# Patient Record
Sex: FEMALE
[De-identification: names, ages, dates, MRNs, and addresses within clinical notes are randomized per-mention and may not be internally consistent; named-entity substitution may affect disease eponyms.]

---

## 2018-05-09 ENCOUNTER — HOSPITAL ENCOUNTER (EMERGENCY)
Dept: HOSPITAL 97 - ER | Age: 48
Discharge: HOME | End: 2018-05-09
Payer: SELF-PAY

## 2018-05-09 VITALS — TEMPERATURE: 98.3 F

## 2018-05-09 VITALS — DIASTOLIC BLOOD PRESSURE: 79 MMHG | OXYGEN SATURATION: 99 % | SYSTOLIC BLOOD PRESSURE: 126 MMHG

## 2018-05-09 DIAGNOSIS — J06.9: Primary | ICD-10-CM

## 2018-05-09 PROCEDURE — 87070 CULTURE OTHR SPECIMN AEROBIC: CPT

## 2018-05-09 PROCEDURE — 87081 CULTURE SCREEN ONLY: CPT

## 2018-05-09 PROCEDURE — 87804 INFLUENZA ASSAY W/OPTIC: CPT

## 2018-05-09 PROCEDURE — 99283 EMERGENCY DEPT VISIT LOW MDM: CPT

## 2018-05-09 PROCEDURE — 71046 X-RAY EXAM CHEST 2 VIEWS: CPT

## 2018-05-09 NOTE — ER
Nurse's Notes                                                                                     

 [facility]                                                                

Name: Ebony Haro                                                                            

Age: 48 yrs                                                                                       

Sex: Female                                                                                       

: 1970                                                                                   

MRN: [id number]                                                                                   

Arrival Date: 2018                                                                          

Time: 17:42                                                                                       

Account#: [id number]                                                                            

Bed 30                                                                                            

Private MD:                                                                                       

Diagnosis: Acute upper respiratory infection, unspecified                                         

                                                                                                  

Presentation:                                                                                     

                                                                                             

17:54 Presenting complaint: Patient states: Saturday may 5th i started getting flu like       ch  

      sympotoms, cough, congestion, body aches. Transition of care: patient was not received      

      from another setting of care. Onset of symptoms was May 05, 2018. Initial Sepsis            

      Screen: Does the patient meet any 2 criteria? No. Patient's initial sepsis screen is        

      negative. Does the patient have a suspected source of infection? No. Patient's initial      

      sepsis screen is negative. Care prior to arrival: None. theraflu.                           

17:54 Method Of Arrival: Ambulatory                                                             

17:54 Acuity: OLIVER 3                                                                             

                                                                                                  

Triage Assessment:                                                                                

17:55 General: Appears in no apparent distress. uncomfortable, Behavior is calm, cooperative,   

      appropriate for age. Pain: Denies pain. Respiratory: Airway is patent Respiratory           

      effort is even, unlabored, Breath sounds with wheezes in right posterior lower lobe         

      fine expiratory wheeze. Derm: Skin is pink, warm \T\ dry.                                   

                                                                                                  

OB/GYN:                                                                                           

17:55 LMP 2018                                                                             

                                                                                                  

Historical:                                                                                       

- Allergies:                                                                                      

17:55 No Known Allergies;                                                                       

- Home Meds:                                                                                      

17:55 None [Active];                                                                            

- PMHx:                                                                                           

17:55 None;                                                                                     

- PSHx:                                                                                           

18:00 Tubal ligation;                                                                           

                                                                                                  

- Immunization history:: Adult Immunizations up to date, Last tetanus immunization:               

  unknown, Flu vaccine is not up to date.                                                         

- Social history:: Smoking status: Patient/guardian denies using tobacco.                         

                                                                                                  

                                                                                                  

Screenin:19 Abuse screen: Denies threats or abuse. Nutritional screening: No deficits noted.        mb3 

      Tuberculosis screening: No symptoms or risk factors identified. Fall Risk None              

      identified.                                                                                 

                                                                                                  

Assessment:                                                                                       

19:12 General: Appears in no apparent distress. comfortable, Behavior is calm, cooperative,   mb3 

      appropriate for age. Neuro: No deficits noted. Level of Consciousness is awake, alert,      

      obeys commands, Oriented to person, place, time, situation. Cardiovascular: No deficits     

      noted. Heart tones present Capillary refill < 3 seconds. Respiratory: Reports cough         

      that is productive, persistent Respiratory effort is even, unlabored, Respiratory           

      pattern is regular, symmetrical, Breath sounds are clear bilaterally. GI: No signs          

      and/or symptoms were reported involving the gastrointestinal system. Abdomen is round       

      obese. : No signs and/or symptoms were reported regarding the genitourinary system.       

      EENT: Reports nasal congestion since Saturday morning nasal discharge that is watery.       

      Musculoskeletal: No signs and/or symptoms reported regarding the musculoskeletal system.    

20:10 Reassessment: Patient appears in no apparent distress at this time. Patient is alert,   aa1 

      oriented x 3, equal unlabored respirations, skin warm/dry/pink. Discussed d/c \T\ f/u       

      instructions with pt; denies questions or concerns Patient states feeling better.           

                                                                                                  

Vital Signs:                                                                                      

17:55  / 85; Pulse 115; Resp 22; Temp 98.3(O); Pulse Ox 99% on R/A; Weight 108.86 kg;   ch  

      Height 5 ft. 2 in. (157.48 cm); Pain 1/10;                                                  

19:21  / 96; Pulse 101; Resp 18; Pulse Ox 100% on R/A; Pain 0/10;                       mb3 

20:10  / 79; Pulse 89; Resp 18; Pulse Ox 99% on R/A;                                    aa1 

17:55 Body Mass Index 43.90 (108.86 kg, 157.48 cm)                                            ch  

17:55 when pt coughs, pain is a 6                                                             ch  

                                                                                                  

ED Course:                                                                                        

17:42 Patient arrived in ED.                                                                  sb2 

17:55 Triage completed.                                                                       ch  

17:55 Arm band placed on left wrist. Patient placed in waiting room.                          ch  

18:11 Patient moved to radiology via wheelchair.                                              jb2 

18:12 X-ray completed. Patient tolerated procedure well.                                      jb2 

18:12 Patient moved back from radiology.                                                      jb2 

18:13 XRAY Chest Pa And Lat (2 Views) In Process Unspecified.                                 EDMS

18:52 Bharat Cullen, NANO is Primary Nurse.                                                     mb3 

18:56 Anum Alejandro FNP-C is PHCP.                                                        kb  

18:56 Abdirahman Eden MD is Attending Physician.                                              kb  

19:20 Patient has correct armband on for positive identification. Bed in low position. Call   mb3 

      light in reach. Side rails up X 1.                                                          

19:21 Throat Culture Sent.                                                                    mb3 

20:10 No provider procedures requiring assistance completed. Patient did not have IV access   aa1 

      during this emergency room visit.                                                           

                                                                                                  

Administered Medications:                                                                         

No medications were administered                                                                  

                                                                                                  

                                                                                                  

Outcome:                                                                                          

19:19 Discharge ordered by MD.                                                                caryn  

20:10 Discharged to home ambulatory.                                                          aa1 

20:10 Condition: good                                                                             

20:10 Discharge instructions given to patient, Instructed on discharge instructions, follow       

      up and referral plans. medication usage, Demonstrated understanding of instructions,        

      follow-up care, medications, Prescriptions given X 1.                                       

20:11 Patient left the ED.                                                                    aa1 

                                                                                                  

Signatures:                                                                                       

Dispatcher [employer]                           EDMS                                                 

Anum Alejandro, FNP-C                 FNP-Sabra Stokes, RN                  RN                                                      

Arlyn Escobedo RN                        RN   aa1                                                  

Shimon Blanton2                                                  

Renetta Mnoroy2                                                  

Bharat Cullen, RN                       RN   mb3                                                  

                                                                                                  

Corrections: (The following items were deleted from the chart)                                    

18:00 17:55 PSHx: None; [facility]  

                                                                                                  

**************************************************************************************************

## 2018-05-09 NOTE — EDPHYS
Physician Documentation                                                                           

 [facility]                                                                

Name: Ebony Haro                                                                            

Age: 48 yrs                                                                                       

Sex: Female                                                                                       

: 1970                                                                                   

MRN: [id number]                                                                                   

Arrival Date: 2018                                                                          

Time: 17:42                                                                                       

Account#: [id number]                                                                            

Bed 30                                                                                            

Private MD:                                                                                       

ED Physician Abdirahman Eden                                                                       

HPI:                                                                                              

                                                                                             

19:18 This 48 yrs old  Female presents to ER via Ambulatory with complaints of Flu    kb  

      Symptoms.                                                                                   

19:18 The patient or guardian reports cough, that is intermittent, described as mild, with no kb  

      sputum. Onset: The symptoms/episode began/occurred 5 day(s) ago. Severity of symptoms:      

      At their worst the symptoms were moderate, in the emergency department the symptoms are     

      unchanged. Modifying factors: The symptoms are alleviated by nothing, the symptoms are      

      aggravated by nothing. Associated signs and symptoms: Pertinent positives: rhinorrhea,      

      sore throat, Pertinent negatives: chest pain, diarrhea, ear ache, fever, nausea,            

      vomiting. The patient has not experienced similar symptoms in the past. The patient has     

      not recently seen a physician.                                                              

                                                                                                  

OB/GYN:                                                                                           

17:55 LMP 2018                                                                             

                                                                                                  

Historical:                                                                                       

- Allergies:                                                                                      

17:55 No Known Allergies;                                                                     ch  

- Home Meds:                                                                                      

17:55 None [Active];                                                                          ch  

- PMHx:                                                                                           

17:55 None;                                                                                   ch  

- PSHx:                                                                                           

18:00 Tubal ligation;                                                                         ch  

                                                                                                  

- Immunization history:: Adult Immunizations up to date, Last tetanus immunization:               

  unknown, Flu vaccine is not up to date.                                                         

- Social history:: Smoking status: Patient/guardian denies using tobacco.                         

                                                                                                  

                                                                                                  

ROS:                                                                                              

19:17 Constitutional: Negative for fever, chills, and weight loss, Cardiovascular: Negative   kb  

      for chest pain, palpitations, and edema, Abdomen/GI: Negative for abdominal pain,           

      nausea, vomiting, diarrhea, and constipation, Back: Negative for injury and pain, :       

      Negative for injury, bleeding, discharge, and swelling, MS/Extremity: Negative for          

      injury and deformity, Skin: Negative for injury, rash, and discoloration, Neuro:            

      Negative for headache, weakness, numbness, tingling, and seizure.                           

19:17 ENT: Positive for rhinorrhea, sinus congestion, sore throat.                                

19:17 Respiratory: Positive for cough, Negative for dyspnea on exertion, hemoptysis,              

      orthopnea, pleurisy, shortness of breath, sputum production, wheezing.                      

                                                                                                  

Exam:                                                                                             

19:17 Constitutional:  This is a well developed, well nourished patient who is awake, alert,  kb  

      and in no acute distress. Head/Face:  Normocephalic, atraumatic. Neck:  Trachea             

      midline, no thyromegaly or masses palpated, and no cervical lymphadenopathy.  Supple,       

      full range of motion without nuchal rigidity, or vertebral point tenderness.  No            

      Meningismus. Chest/axilla:  Normal chest wall appearance and motion.  Nontender with no     

      deformity.  No lesions are appreciated. Cardiovascular:  Regular rate and rhythm with a     

      normal S1 and S2.  No gallops, murmurs, or rubs.  Normal PMI, no JVD.  No pulse             

      deficits. Respiratory:  Lungs have equal breath sounds bilaterally, clear to                

      auscultation and percussion.  No rales, rhonchi or wheezes noted.  No increased work of     

      breathing, no retractions or nasal flaring. Abdomen/GI:  Soft, non-tender, with normal      

      bowel sounds.  No distension or tympany.  No guarding or rebound.  No evidence of           

      tenderness throughout. Skin:  Warm, dry with normal turgor.  Normal color with no           

      rashes, no lesions, and no evidence of cellulitis. MS/ Extremity:  Pulses equal, no         

      cyanosis.  Neurovascular intact.  Full, normal range of motion. Neuro:  Awake and           

      alert, GCS 15, oriented to person, place, time, and situation.  Cranial nerves II-XII       

      grossly intact.  Motor strength 5/5 in all extremities.  Sensory grossly intact.            

      Cerebellar exam normal.  Normal gait.                                                       

19:17 ENT: External ear(s): are unremarkable, Ear canal(s): are normal, TM's: are normal,         

      Nose: is normal, Mouth: is normal, Posterior pharynx: Airway: normal, no evidence of        

      obstruction, Tonsils: bilaterally enlarged, Uvula: normal, midline, swelling, that is       

      mild, erythema, is not appreciated, exudate, is not appreciated.                            

                                                                                                  

Vital Signs:                                                                                      

17:55  / 85; Pulse 115; Resp 22; Temp 98.3(O); Pulse Ox 99% on R/A; Weight 108.86 kg;   ch  

      Height 5 ft. 2 in. (157.48 cm); Pain 1/10;                                                  

19:21  / 96; Pulse 101; Resp 18; Pulse Ox 100% on R/A; Pain 0/10;                       mb3 

20:10  / 79; Pulse 89; Resp 18; Pulse Ox 99% on R/A;                                    aa1 

17:55 Body Mass Index 43.90 (108.86 kg, 157.48 cm)                                              

17:55 when pt coughs, pain is a 6                                                             ch  

                                                                                                  

MDM:                                                                                              

18:57 Patient medically screened.                                                             kb  

19:17 Data reviewed: vital signs, nurses notes. Data interpreted: Pulse oximetry: on room air kb  

      is 99 %. Interpretation: normal. Counseling: I had a detailed discussion with the           

      patient and/or guardian regarding: the historical points, exam findings, and any            

      diagnostic results supporting the discharge/admit diagnosis, lab results, radiology         

      results, the need for outpatient follow up, a family practitioner, to return to the         

      emergency department if symptoms worsen or persist or if there are any questions or         

      concerns that arise at home.                                                                

                                                                                                  

                                                                                             

18:00 Order name: Flu; Complete Time: 18:57                                                     

                                                                                             

18:00 Order name: Strep; Complete Time: 18:57                                                   

                                                                                             

18:00 Order name: XRAY Chest Pa And Lat (2 Views); Complete Time: 19:16                         

                                                                                             

18:26 Order name: Throat Culture                                                              [facility]

                                                                                             

18:58 Order name: Vital Signs; Complete Time: 19:21                                           kb  

                                                                                                  

Administered Medications:                                                                         

No medications were administered                                                                  

                                                                                                  

                                                                                                  

Disposition:                                                                                      

22:36 Co-signature as Attending Physician, Abdirahman Eden MD I agree with the assessment and   kdr 

      plan of care.                                                                               

                                                                                                  

Disposition:                                                                                      

18 19:19 Discharged to Home. Impression: Acute upper respiratory infection, unspecified.    

- Condition is Stable.                                                                            

- Discharge Instructions: Upper Respiratory Infection, Adult, Easy-to-Read.                       

- Prescriptions for Tessalon Perles 100 mg Oral Capsule - take 1 capsule by ORAL route            

  every 8 hours As needed; 15 capsule.                                                            

- Medication Reconciliation Form, Thank You Letter, Antibiotic Education, Prescription            

  Opioid Use, Work release form form.                                                             

- Follow up: Emergency Department; When: As needed; Reason: Worsening of condition.               

  Follow up: Private Physician; When: 2 - 3 days; Reason: Recheck today's complaints,             

  Continuance of care, Re-evaluation by your physician.                                           

                                                                                                  

                                                                                                  

                                                                                                  

Signatures:                                                                                       

Dispatcher [facility]                                                 

Anum Alejandro FNP-C FNP-Ckb Hammond, Christina RN                  RN                                                      

Hendry, Arlyn, RN                        RN   aa1                                                  

Abdirahman Eden MD MD   kdr                                                  

                                                                                                  

Corrections: (The following items were deleted from the chart)                                    

18:00 17:55 PSHx: None; [facility]  

20:11 19:19 2018 19:19 Discharged to Home. Impression: Acute upper respiratory          aa1 

      infection, unspecified. Condition is Stable. Forms are Medication Reconciliation Form,      

      Thank You Letter, Antibiotic Education, Prescription Opioid Use. Follow up: Emergency       

      Department; When: As needed; Reason: Worsening of condition. Follow up: Private             

      Physician; When: 2 - 3 days; Reason: Recheck today's complaints, Continuance of care,       

      Re-evaluation by your physician. kb                                                         

                                                                                                  

**************************************************************************************************

## 2018-05-09 NOTE — RAD REPORT
EXAM DESCRIPTION:  RAD - Chest Pa And Lat (2 Views) - 5/9/2018 6:17 pm

 

CLINICAL HISTORY:  Cough and congestion, body aches, possible viral infection

 

COMPARISON:  August 2012

 

TECHNIQUE:  PA and lateral views of the chest were obtained.

 

FINDINGS:  The lungs are clear.   Heart size is normal and central vasculature is within normal limit
s.  No pleural effusion or pneumothorax seen.  No acute bony finding noted.  No aortic abnormality.

 

IMPRESSION:  No acute cardiopulmonary process.  No suspicious change from comparison.

## 2018-08-10 ENCOUNTER — HOSPITAL ENCOUNTER (EMERGENCY)
Dept: HOSPITAL 97 - ER | Age: 48
Discharge: HOME | End: 2018-08-10
Payer: SELF-PAY

## 2018-08-10 DIAGNOSIS — M25.571: Primary | ICD-10-CM

## 2018-08-10 PROCEDURE — 99283 EMERGENCY DEPT VISIT LOW MDM: CPT

## 2018-08-10 NOTE — RAD REPORT
EXAM DESCRIPTION:  RAD - Ankle Right 3 View - 8/10/2018 4:59 pm

 

CLINICAL HISTORY:  PAIN

 

COMPARISON:  No comparisons

 

FINDINGS:  Soft tissue swelling is seen in the region of the lateral malleolus. Minimal bony fragment
ation in the region may be acute or chronic avulsion injury. Prominent calcaneal spur seen.

## 2018-08-10 NOTE — EDPHYS
Physician Documentation                                                                           

 [facility]                                                                

Name: Ebony Haro                                                                            

Age: 48 yrs                                                                                       

Sex: Female                                                                                       

: 1970                                                                                   

MRN: [id number]                                                                                   

Arrival Date: 08/10/2018                                                                          

Time: 16:20                                                                                       

Account#: [id number]                                                                            

Bed 14                                                                                            

Private MD: None, None                                                                            

ED Physician Shoaib Grijalva                                                                      

HPI:                                                                                              

08/10                                                                                             

16:33 This 48 yrs old  Female presents to ER via Ambulatory with complaints of Ankle  cp  

      Injury.                                                                                     

16:33 The patient presents with pain, that is acute. The complaints affect the right medial   cp  

      malleolus. Onset: The symptoms/episode began/occurred today. Context: The problem was       

      sustained at home, resulted from striking area against bed yesterday. Associated signs      

      and symptoms: Pertinent negatives: fever, numbness, tingling. Modifying factors: the        

      symptoms are aggravated by weight bearing.                                                  

                                                                                                  

OB/GYN:                                                                                           

16:23 LMP 7/10/2018                                                                           hj  

                                                                                                  

Historical:                                                                                       

- Allergies:                                                                                      

16:22 No Known Allergies;                                                                     hj  

- Home Meds:                                                                                      

16:22 None [Active];                                                                          hj  

- PMHx:                                                                                           

16:22 None;                                                                                   hj  

- PSHx:                                                                                           

16:22 Tubal ligation;                                                                         hj  

                                                                                                  

- Immunization history:: Adult Immunizations up to date.                                          

- Social history:: Smoking status: Patient/guardian denies using tobacco,                         

  Patient/guardian denies using alcohol.                                                          

- Ebola Screening: : Patient negative for fever greater than or equal to 101.5 degrees            

  Fahrenheit, and additional compatible Ebola Virus Disease symptoms Patient denies               

  exposure to infectious person Patient denies travel to an Ebola-affected area in the            

  21 days before illness onset.                                                                   

                                                                                                  

                                                                                                  

ROS:                                                                                              

16:35 Constitutional: Negative for body aches, chills, fever, poor PO intake.                 cp  

16:35 Eyes: Negative for injury, pain, redness, and discharge.                                cp  

16:35 ENT: Negative for drainage from ear(s), ear pain, sore throat, difficulty swallowing,       

      difficulty handling secretions.                                                             

16:35 Cardiovascular: Negative for chest pain, edema, palpitations.                               

16:35 Respiratory: Negative for cough, shortness of breath, wheezing.                             

16:35 Abdomen/GI: Negative for abdominal pain, nausea, vomiting, and diarrhea.                    

16:35 MS/extremity: Positive for pain, tenderness, of the right medial malleolus, Negative        

      for decreased range of motion, deformity, paresthesias.                                     

16:35 Skin: Negative for cellulitis, rash.                                                        

16:35 Neuro: Negative for numbness, tingling, weakness.                                           

16:35 All other systems are negative.                                                             

                                                                                                  

Exam:                                                                                             

16:48 Constitutional: The patient appears in no acute distress, alert, awake, comfortable,    cp  

      well developed, well nourished.                                                             

16:48 Head/Face:  Normocephalic, atraumatic.                                                  cp  

16:48 Eyes: Periorbital structures: appear normal, Conjunctiva: normal, no exudate, no        cp  

      injection, Lids and lashes: appear normal, bilaterally.                                     

16:48 ENT: External ear(s): are unremarkable, Nose: is normal, Posterior pharynx: is normal,      

      airway is patent.                                                                           

16:48 Chest/axilla: Inspection: normal.                                                       cp  

16:48 Cardiovascular: Rate: normal.                                                               

16:48 Respiratory: the patient does not display signs of respiratory distress,  Respirations:     

      normal, no use of accessory muscles, no retractions, no splinting, no tachypnea.            

16:48 Abdomen/GI: Exam negative for discomfort, distension, guarding, Inspection: abdomen         

      appears normal.                                                                             

16:48 Back: pain, is absent, ROM is normal.                                                       

16:48 Musculoskeletal/extremity: Extremities: grossly normal except: noted in the right           

      medial malleolus: swelling, tenderness, There is no evidence of  decreased ROM,             

      deformity, Perfusion: the extremity is normally perfused throughout, Sensation intact.      

16:48 Skin: cellulitis, is not appreciated, no rash present.                                      

                                                                                                  

Vital Signs:                                                                                      

16:23  / 79; Pulse 90; Resp 18; Temp 97.96(TE); Pulse Ox 97% on R/A; Weight 99.79 kg;   hj  

      Height 5 ft. 2 in. (157.48 cm); Pain 4/10;                                                  

16:23 Body Mass Index 40.24 (99.79 kg, 157.48 cm)                                             hj  

                                                                                                  

MDM:                                                                                              

16:25 Patient medically screened.                                                             cp  

17:00 Differential diagnosis: fracture, sprain, contusion, dislocation.                       cp  

17:25 Data reviewed: vital signs, nurses notes, radiologic studies, plain films.              cp  

17:25 Test interpretation: by ED physician or midlevel provider: plain radiologic studies.    cp  

      Counseling: I had a detailed discussion with the patient and/or guardian regarding: the     

      historical points, exam findings, and any diagnostic results supporting the                 

      discharge/admit diagnosis, radiology results, to return to the emergency department if      

      symptoms worsen or persist or if there are any questions or concerns that arise at home.    

                                                                                                  

08/10                                                                                             

16:31 Order name: XRAY Ankle RIGHT 3 view; Complete Time: 17:21                               cp  

08/10                                                                                             

17:22 Interpretation: Report reviewed.                                                        cp  

                                                                                                  

Administered Medications:                                                                         

16:34 Drug: Ibuprofen 800 mg Route: PO;                                                       la1 

17:14 Follow up: Response: No adverse reaction; Pain is decreased                             em  

                                                                                                  

                                                                                                  

Disposition:                                                                                      

08/10/18 17:27 Discharged to Home. Impression: Pain in right ankle and joints of right foot.      

- Condition is Stable.                                                                            

- Discharge Instructions: Ankle Pain.                                                             

- Prescriptions for Ibuprofen 800 mg Oral Tablet - take 1 tablet by ORAL route every 8            

  hours As needed take with food; 30 tablet.                                                      

- Medication Reconciliation Form, Thank You Letter, Antibiotic Education, Prescription            

  Opioid Use form.                                                                                

- Follow up: Private Physician; When: 5 - 6 days; Reason: if pain continues.                      

- Problem is new.                                                                                 

- Symptoms have improved.                                                                         

                                                                                                  

                                                                                                  

                                                                                                  

Addendum:                                                                                         

2018                                                                                        

     22:13 Co-signature as Attending Physician, Shoaib Grijalva MD Available for consultation at    p
s1

           all times. .                                                                           

                                                                                                  

Signatures:                                                                                       

Dispatcher [employer]                           EDMS                                                 

Von Martin, JUANN                       LVN  em                                                   

Mor Deras RN                         RN   la1                                                  

Roldan Brown RN                      RN   hj                                                   

Luis Alberto Rosen, PA                         PA   Shoaib Marquez MD MD   ps1                                                  

                                                                                                  

Corrections: (The following items were deleted from the chart)                                    

08/10                                                                                             

17:43 17:27 08/10/2018 17:27 Discharged to Home. Impression: Pain in right ankle and joints   em  

      of right foot. Condition is Stable. Forms are Medication Reconciliation Form, Thank You     

      Letter, Antibiotic Education, Prescription Opioid Use. Follow up: Private Physician;        

      When: 5 - 6 days; Reason: if pain continues. Problem is new. Symptoms have improved. cp     

                                                                                                  

**************************************************************************************************

## 2018-08-10 NOTE — ER
Nurse's Notes                                                                                     

 [facility]                                                                

Name: Ebony Haro                                                                            

Age: 48 yrs                                                                                       

Sex: Female                                                                                       

: 1970                                                                                   

MRN: [id number]                                                                                   

Arrival Date: 08/10/2018                                                                          

Time: 16:20                                                                                       

Account#: [id number]                                                                            

Bed 14                                                                                            

Private MD: None, None                                                                            

Diagnosis: Pain in right ankle and joints of right foot                                           

                                                                                                  

Presentation:                                                                                     

08/10                                                                                             

16:21 Presenting complaint: Patient states: i hurt my R ankle yesterday on the bed; denies    hj  

      swelling; pain 4/10;. Transition of care: patient was not received from another setting     

      of care. Onset of symptoms was August 10, 2018. Risk Assessment: Do you want to hurt        

      yourself or someone else? Patient reports no desire to harm self or others. Initial         

      Sepsis Screen: Does the patient meet any 2 criteria? No. Patient's initial sepsis           

      screen is negative. Does the patient have a suspected source of infection? No.              

      Patient's initial sepsis screen is negative. Care prior to arrival: None.                   

16:21 Method Of Arrival: Ambulatory                                                             

16:21 Acuity: OLIVER 4                                                                           hj  

                                                                                                  

Triage Assessment:                                                                                

16:22 General: Appears in no apparent distress. uncomfortable, Behavior is calm, cooperative, hj  

      appropriate for age. Pain: Complains of pain in right medial malleolus.                     

      Musculoskeletal: Reports pain in right medial malleolus.                                    

                                                                                                  

OB/GYN:                                                                                           

16:23 LMP 7/10/2018                                                                             

                                                                                                  

Historical:                                                                                       

- Allergies:                                                                                      

16:22 No Known Allergies;                                                                     hj  

- Home Meds:                                                                                      

16:22 None [Active];                                                                          hj  

- PMHx:                                                                                           

16:22 None;                                                                                   hj  

- PSHx:                                                                                           

16:22 Tubal ligation;                                                                         hj  

                                                                                                  

- Immunization history:: Adult Immunizations up to date.                                          

- Social history:: Smoking status: Patient/guardian denies using tobacco,                         

  Patient/guardian denies using alcohol.                                                          

- Ebola Screening: : Patient negative for fever greater than or equal to 101.5 degrees            

  Fahrenheit, and additional compatible Ebola Virus Disease symptoms Patient denies               

  exposure to infectious person Patient denies travel to an Ebola-affected area in the            

  21 days before illness onset.                                                                   

                                                                                                  

                                                                                                  

Screenin:22 Abuse screen: Denies threats or abuse. Denies injuries from another. Nutritional        hj  

      screening: No deficits noted. Tuberculosis screening: No symptoms or risk factors           

      identified. Fall Risk None identified.                                                      

                                                                                                  

Assessment:                                                                                       

16:35 Reassessment: Patient is alert, oriented x 3, equal unlabored respirations, skin        la1 

      warm/dry/pink. Musculoskeletal: Circulation, motion, and sensation intact. Capillary        

      refill < 3 seconds, is brisk, in bilateral toes. Range of motion: limited in right          

      ankle.                                                                                      

17:15 Reassessment: Patient appears in no apparent distress at this time. Patient and/or      em  

      family updated on plan of care and expected duration. Pain level reassessed. Patient is     

      alert, oriented x 3, equal unlabored respirations, skin warm/dry/pink. Patient states       

      feeling better.                                                                             

                                                                                                  

Vital Signs:                                                                                      

16:23  / 79; Pulse 90; Resp 18; Temp 97.96(TE); Pulse Ox 97% on R/A; Weight 99.79 kg;   hj  

      Height 5 ft. 2 in. (157.48 cm); Pain 4/10;                                                  

16:23 Body Mass Index 40.24 (99.79 kg, 157.48 cm)                                             hj  

                                                                                                  

ED Course:                                                                                        

16:20 Patient arrived in ED.                                                                  sb2 

16:20 None, None is Private Physician.                                                        sb2 

16:22 Triage completed.                                                                       hj  

16:23 Arm band placed on left wrist.                                                          hj  

16:24 Patient has correct armband on for positive identification. Bed in low position. Call   hj  

      light in reach. Side rails up X 1.                                                          

16:25 Luis Alberto Rosen PA is PHCP.                                                                cp  

16:25 Shoiab Grijalva MD is Attending Physician.                                             cp  

16:34 Mor Deras, NANO is Primary Nurse.                                                       la1 

16:35 No provider procedures requiring assistance completed. Patient did not have IV access   la1 

      during this emergency room visit.                                                           

16:59 X-ray completed. Portable x-ray completed in exam room. Patient tolerated procedure     ml  

      well.                                                                                       

17:00 XRAY Ankle RIGHT 3 view In Process Unspecified.                                         EDMS

                                                                                                  

Administered Medications:                                                                         

16:34 Drug: Ibuprofen 800 mg Route: PO;                                                       la1 

17:14 Follow up: Response: No adverse reaction; Pain is decreased                             em  

                                                                                                  

                                                                                                  

Outcome:                                                                                          

17:27 Discharge ordered by MD.                                                                cp  

17:43 Discharged to home ambulatory.                                                          em  

17:43 Condition: good                                                                             

17:43 Discharge instructions given to patient, Instructed on discharge instructions, follow       

      up and referral plans. medication usage, Demonstrated understanding of instructions,        

      follow-up care, medications, Prescriptions given X 1.                                       

17:43 Patient left the ED.                                                                    em  

                                                                                                  

Signatures:                                                                                       

Dispatcher [employer]                           EDMS                                                 

Martin, Von, LVN                       LVN  Princess Delvalle Lee, RN                         RN   la1                                                  

Roldan Brown RN                      RN   hj                                                   

Luis Alberto Rosen PA PA cp Billeau, Sheri                               sb2                                                  

                                                                                                  

Corrections: (The following items were deleted from the chart)                                    

16:25 16:23 Pulse 90bpm; Resp 18bpm; Pulse Ox 97% RA; Temp 97.96F Temporal; 99.79 kg; Height  hj  

      5 ft. 2 in.; BMI: 40.2; Pain 4/10; hj                                                       

                                                                                                  

**************************************************************************************************

## 2018-12-24 ENCOUNTER — HOSPITAL ENCOUNTER (EMERGENCY)
Dept: HOSPITAL 97 - ER | Age: 48
Discharge: HOME | End: 2018-12-24
Payer: SELF-PAY

## 2018-12-24 DIAGNOSIS — J06.9: Primary | ICD-10-CM

## 2018-12-24 PROCEDURE — 99283 EMERGENCY DEPT VISIT LOW MDM: CPT

## 2018-12-24 NOTE — ER
Nurse's Notes                                                                                     

 [facility]                                                                

Name: Ebony Haro                                                                            

Age: 48 yrs                                                                                       

Sex: Female                                                                                       

: 1970                                                                                   

MRN: [id number]                                                                                   

Arrival Date: 2018                                                                          

Time: 09:51                                                                                       

Account#: [id number]                                                                            

Bed 24                                                                                            

Private MD: None, None                                                                            

Diagnosis: Fever, unspecified;Acute upper respiratory infection, unspecified                      

                                                                                                  

Presentation:                                                                                     

                                                                                             

10:10 Presenting complaint: Patient states: cough, sore throat, congestion on and off x 2     aa5 

      weeks ago. Pt reports it got worse 3 days ago. Transition of care: patient was not          

      received from another setting of care. Onset of symptoms was 2018. Risk            

      Assessment: Do you want to hurt yourself or someone else? Patient reports no desire to      

      harm self or others. Initial Sepsis Screen: Does the patient meet any 2 criteria? No.       

      Patient's initial sepsis screen is negative. Does the patient have a suspected source       

      of infection? No. Patient's initial sepsis screen is negative. Care prior to arrival:       

      None.                                                                                       

10:10 Method Of Arrival: Ambulatory                                                           aa5 

10:10 Acuity: OLIVER 4                                                                           aa5 

                                                                                                  

OB/GYN:                                                                                           

10:11 LMP 12/10/2018                                                                          aa5 

                                                                                                  

Historical:                                                                                       

- Allergies:                                                                                      

10:11 No Known Allergies;                                                                     aa5 

- Home Meds:                                                                                      

10:11 None [Active];                                                                          aa5 

- PMHx:                                                                                           

10:11 None;                                                                                   aa5 

- PSHx:                                                                                           

10:11 Tubal ligation;                                                                         aa5 

                                                                                                  

- Immunization history:: Flu vaccine is not up to date.                                           

- Social history:: Smoking status: Patient/guardian denies using tobacco.                         

- Ebola Screening: : No symptoms or risks identified at this time.                                

- Family history:: not pertinent.                                                                 

                                                                                                  

                                                                                                  

Screening:                                                                                        

10:36 Abuse screen: Denies threats or abuse. Denies injuries from another. Nutritional        aj  

      screening: No deficits noted. Tuberculosis screening: No symptoms or risk factors           

      identified. Fall Risk None identified.                                                      

                                                                                                  

Assessment:                                                                                       

10:36 General: Appears in no apparent distress. comfortable, Behavior is calm, cooperative,   aj  

      appropriate for age. Pain: Denies pain. Neuro: Level of Consciousness is awake, alert,      

      obeys commands, Oriented to person, place, time, situation, Appropriate for age.            

      Respiratory: Airway is patent Respiratory effort is even, unlabored, Respiratory            

      pattern is regular, symmetrical, Breath sounds are clear bilaterally. Respiratory:          

      Reports cough that is. EENT: Throat is reddened. Derm: Skin is intact, is healthy with      

      good turgor, Skin is pink, warm \T\ dry. normal.                                            

                                                                                                  

Vital Signs:                                                                                      

10:11  / 94; Pulse 99; Resp 18 S; Temp 99.1(O); Pulse Ox 97% on R/A; Weight 124.74 kg   aa5 

      (R); Height 5 ft. 2 in. (157.48 cm) (R); Pain 6/10;                                         

10:11 Body Mass Index 50.30 (124.74 kg, 157.48 cm)                                            aa5 

                                                                                                  

ED Course:                                                                                        

09:51 Patient arrived in ED.                                                                  sb2 

09:51 None, None is Private Physician.                                                        sb2 

10:10 Triage completed.                                                                       aa5 

10:10 Arm band placed on.                                                                     aa 

10:13 Luis Alberto Leyva MD is Attending Physician.                                             [facility] 

10:15 Debby Funk RN is Primary Nurse.                                                     aj  

10:36 Patient has correct armband on for positive identification.                             aj  

10:36 No provider procedures requiring assistance completed. Patient did not have IV access   aj  

      during this emergency room visit.                                                           

                                                                                                  

Administered Medications:                                                                         

10:21 CANCELLED (Duplicate Order): Zithromax 500 mg PO once                                   lucie 

10:37 Drug: Augmentin 875 mg Route: PO;                                                       aj  

10:57 Follow up: Response: No adverse reaction                                                  

                                                                                                  

                                                                                                  

Outcome:                                                                                          

10:22 Discharge ordered by MD.                                                                lucie 

10:57 Discharged to home ambulatory.                                                          aj  

10:57 Condition: good                                                                             

10:57 Discharge instructions given to patient, Instructed on discharge instructions, follow       

      up and referral plans. medication usage, Demonstrated understanding of instructions,        

      follow-up care, medications, Prescriptions given X 1.                                       

10:57 Patient left the ED.                                                                    aj  

                                                                                                  

Signatures:                                                                                       

Debby Funk, RN                       RN   Luis Alberto Mon MD MD cha Calderon, Audri RN                     RN   aa5                                                  

Renetta Monroy                               sb2                                                  

                                                                                                  

**************************************************************************************************

## 2018-12-24 NOTE — EDPHYS
Physician Documentation                                                                           

 [facility]                                                                

Name: Ebony Haro                                                                            

Age: 48 yrs                                                                                       

Sex: Female                                                                                       

: 1970                                                                                   

MRN: [id number]                                                                                   

Arrival Date: 2018                                                                          

Time: 09:51                                                                                       

Account#: [id number]                                                                            

Bed 24                                                                                            

Private MD: None, None                                                                            

ED Physician Luis Alberto Leyva                                                                      

HPI:                                                                                              

                                                                                             

10:18 This 48 yrs old  Female presents to ER via Ambulatory with complaints of Sore   lucie 

      Throat, STUFFY NOSE.                                                                        

10:18 The patient presents with sore throat. The patient describes throat pain as burning,    lucie 

      constant. Onset: The symptoms/episode began/occurred 2 day(s) ago.                          

10:18 Severity of symptoms: At their worst the symptoms were mild, in the emergency           lucie 

      department the symptoms are unchanged. Modifying factors: The symptoms are alleviated       

      by nothing, the symptoms are aggravated by nothing. Associated signs and symptoms:          

      Pertinent positives: cough, Sore throat.                                                    

                                                                                                  

OB/GYN:                                                                                           

10:11 LMP 12/10/2018                                                                          aa5 

                                                                                                  

Historical:                                                                                       

- Allergies:                                                                                      

10:11 No Known Allergies;                                                                     aa5 

- Home Meds:                                                                                      

10:11 None [Active];                                                                          aa5 

- PMHx:                                                                                           

10:11 None;                                                                                   aa5 

- PSHx:                                                                                           

10:11 Tubal ligation;                                                                         aa5 

                                                                                                  

- Immunization history:: Flu vaccine is not up to date.                                           

- Social history:: Smoking status: Patient/guardian denies using tobacco.                         

- Ebola Screening: : No symptoms or risks identified at this time.                                

- Family history:: not pertinent.                                                                 

                                                                                                  

                                                                                                  

ROS:                                                                                              

10:18 Constitutional: Negative for fever, chills, and weight loss, Eyes: Negative for injury, lucie 

      pain, redness, and discharge, Neck: Negative for injury, pain, and swelling,                

      Cardiovascular: Negative for chest pain, palpitations, and edema, Respiratory: Negative     

      for shortness of breath, cough, wheezing, and pleuritic chest pain, Abdomen/GI:             

      Negative for abdominal pain, nausea, vomiting, diarrhea, and constipation, Back:            

      Negative for injury and pain, : Negative for injury, bleeding, discharge, and             

      swelling, MS/Extremity: Negative for injury and deformity, Skin: Negative for injury,       

      rash, and discoloration, Neuro: Negative for headache, weakness, numbness, tingling,        

      and seizure.                                                                                

10:18 ENT: Positive for rhinorrhea, sore throat.                                                  

                                                                                                  

Exam:                                                                                             

10:18 Constitutional:  This is a well developed, well nourished patient who is awake, alert,  lucie 

      and in no acute distress. Head/Face:  Normocephalic, atraumatic. Eyes:  Pupils equal        

      round and reactive to light, extra-ocular motions intact.  Lids and lashes normal.          

      Conjunctiva and sclera are non-icteric and not injected.  Cornea within normal limits.      

      Periorbital areas with no swelling, redness, or edema. Neck:  Trachea midline, no           

      thyromegaly or masses palpated, and no cervical lymphadenopathy.  Supple, full range of     

      motion without nuchal rigidity, or vertebral point tenderness.  No Meningismus.             

      Chest/axilla:  Normal chest wall appearance and motion.  Nontender with no deformity.       

      No lesions are appreciated. Cardiovascular:  Regular rate and rhythm with a normal S1       

      and S2.  No gallops, murmurs, or rubs.  Normal PMI, no JVD.  No pulse deficits.             

      Respiratory:  Lungs have equal breath sounds bilaterally, clear to auscultation and         

      percussion.  No rales, rhonchi or wheezes noted.  No increased work of breathing, no        

      retractions or nasal flaring. Abdomen/GI:  Soft, non-tender, with normal bowel sounds.      

      No distension or tympany.  No guarding or rebound.  No evidence of tenderness               

      throughout. Back:  No spinal tenderness.  No costovertebral tenderness.  Full range of      

      motion. Skin:  Warm, dry with normal turgor.  Normal color with no rashes, no lesions,      

      and no evidence of cellulitis. MS/ Extremity:  Pulses equal, no cyanosis.                   

      Neurovascular intact.  Full, normal range of motion. Neuro:  Awake and alert, GCS 15,       

      oriented to person, place, time, and situation.  Cranial nerves II-XII grossly intact.      

      Motor strength 5/5 in all extremities.  Sensory grossly intact.  Cerebellar exam            

      normal.  Normal gait. Psych:  Awake, alert, with orientation to person, place and time.     

       Behavior, mood, and affect are within normal limits.                                       

10:18 ENT: Nose: Posterior pharynx: Tonsils: are normal in appearance, Uvula: normal,             

      swelling, is not appreciated, erythema, that is mild, exudate, is not appreciated.          

                                                                                                  

Vital Signs:                                                                                      

10:11  / 94; Pulse 99; Resp 18 S; Temp 99.1(O); Pulse Ox 97% on R/A; Weight 124.74 kg   aa5 

      (R); Height 5 ft. 2 in. (157.48 cm) (R); Pain 6/10;                                         

10:11 Body Mass Index 50.30 (124.74 kg, 157.48 cm)                                            aa5 

                                                                                                  

MDM:                                                                                              

10:14 Patient medically screened.                                                             [facility] 

10:20 Data reviewed: vital signs, nurses notes.                                               [facility] 

                                                                                                  

Administered Medications:                                                                         

10:21 CANCELLED (Duplicate Order): Zithromax 500 mg PO once                                   [facility] 

10:37 Drug: Augmentin 875 mg Route: PO;                                                         

10:57 Follow up: Response: No adverse reaction                                                  

                                                                                                  

                                                                                                  

Disposition:                                                                                      

18 10:22 Discharged to Home. Impression: Fever, unspecified, Acute upper respiratory        

  infection, unspecified.                                                                         

- Condition is Stable.                                                                            

- Discharge Instructions: Upper Respiratory Infection, Adult, Cool Mist Vaporizer,                

  Upper Respiratory Infection, Adult, Easy-to-Read, Cough, Adult.                                 

- Prescriptions for Augmentin 875- 125 mg Oral Tablet - take 1 tablet by ORAL route               

  every 12 hours for 10 days; 20 tablet.                                                          

- Medication Reconciliation Form, Thank You Letter, Antibiotic Education, Prescription            

  Opioid Use form.                                                                                

- Follow up: Private Physician; When: 2 - 3 days; Reason: Recheck today's complaints,             

  Continuance of care, Re-evaluation by your physician.                                           

- Problem is new.                                                                                 

- Symptoms have improved.                                                                         

                                                                                                  

                                                                                                  

                                                                                                  

Signatures:                                                                                       

Dispatcher [employer]                           EDDebby Perkins RN RN aj Anderson, Corey, MD MD cha Calderon, Audri, RN                     RN   aa5                                                  

                                                                                                  

Corrections: (The following items were deleted from the chart)                                    

10:21 10:18 Zithromax 500 mg PO once ordered. [facility] 

10:35 10:16 Influenza Screen (A \T\ B)+BA.LAB.BRZ ordered. [facility]

10:35 10:16 Group A Streptococcus Rapid Sc+BA.LAB.BRZ ordered. [facility]                           EDMS

10:57 10:22 2018 10:22 Discharged to Home. Impression: Fever, unspecified; Acute upper  aj  

      respiratory infection, unspecified. Condition is Stable. Forms are Medication               

      Reconciliation Form, Thank You Letter, Antibiotic Education, Prescription Opioid Use.       

      Follow up: Private Physician; When: 2 - 3 days; Reason: Recheck today's complaints,         

      Continuance of care, Re-evaluation by your physician. Problem is new. Symptoms have         

      improved. [facility]                                                                               

                                                                                                  

**************************************************************************************************

## 2019-02-02 ENCOUNTER — HOSPITAL ENCOUNTER (EMERGENCY)
Dept: HOSPITAL 97 - ER | Age: 49
Discharge: HOME | End: 2019-02-02
Payer: SELF-PAY

## 2019-02-02 VITALS — OXYGEN SATURATION: 98 % | TEMPERATURE: 97.8 F | DIASTOLIC BLOOD PRESSURE: 102 MMHG | SYSTOLIC BLOOD PRESSURE: 151 MMHG

## 2019-02-02 DIAGNOSIS — J06.9: Primary | ICD-10-CM

## 2019-02-02 PROCEDURE — 87070 CULTURE OTHR SPECIMN AEROBIC: CPT

## 2019-02-02 PROCEDURE — 87081 CULTURE SCREEN ONLY: CPT

## 2019-02-02 PROCEDURE — 99284 EMERGENCY DEPT VISIT MOD MDM: CPT

## 2019-02-02 PROCEDURE — 94640 AIRWAY INHALATION TREATMENT: CPT

## 2019-02-02 PROCEDURE — 87804 INFLUENZA ASSAY W/OPTIC: CPT

## 2019-02-02 NOTE — EDPHYS
Physician Documentation                                                                           

 [facility]                                                                

Name: Ebony Haro                                                                            

Age: 49 yrs                                                                                       

Sex: Female                                                                                       

: 1970                                                                                   

MRN: [id number]                                                                                   

Arrival Date: 2019                                                                          

Time: 12:30                                                                                       

Account#: [id number]                                                                            

Bed 12                                                                                            

Private MD:                                                                                       

ED Physician Shoaib Grijalva                                                                      

HPI:                                                                                              

                                                                                             

12:43 This 49 yrs old  Female presents to ER via Ambulatory with complaints of Cough, kb  

      Sore Throat.                                                                                

12:43 The patient or guardian reports cough, that is intermittent, described as moderate,     kb  

      with no sputum, flu symptoms, low-grade fever, myalgias. Onset: The symptoms/episode        

      began/occurred yesterday. Severity of symptoms: At their worst the symptoms were mild,      

      moderate, in the emergency department the symptoms are unchanged. Modifying factors:        

      The symptoms are alleviated by nothing, the symptoms are aggravated by nothing.             

      Associated signs and symptoms: Pertinent positives: diarrhea, fever, rhinorrhea, sore       

      throat, Pertinent negatives: chest pain, ear ache, nausea, vomiting. The patient has        

      not experienced similar symptoms in the past. The patient has not recently seen a           

      physician.                                                                                  

                                                                                                  

OB/GYN:                                                                                           

13:30 LMP N/A -                                                                               iw  

                                                                                                  

Historical:                                                                                       

- Allergies:                                                                                      

12:35 No Known Allergies;                                                                     la1 

- PMHx:                                                                                           

12:35 None;                                                                                   la1 

                                                                                                  

- Immunization history:: Adult Immunizations up to date.                                          

- Social history:: Smoking status: Patient/guardian denies using tobacco.                         

- Ebola Screening: : No symptoms or risks identified at this time.                                

                                                                                                  

                                                                                                  

ROS:                                                                                              

12:40 Neck: Negative for injury, pain, and swelling, Cardiovascular: Negative for chest pain, kb  

      palpitations, and edema, Back: Negative for injury and pain, : Negative for injury,       

      bleeding, discharge, and swelling, MS/Extremity: Negative for injury and deformity,         

      Skin: Negative for injury, rash, and discoloration, Neuro: Negative for headache,           

      weakness, numbness, tingling, and seizure.                                                  

12:40 Constitutional: Positive for body aches, chills, fatigue, malaise, Negative for fever,      

      poor PO intake, weight loss.                                                                

12:40 ENT: Positive for rhinorrhea, sore throat.                                                  

12:40 Respiratory: Positive for cough, Negative for dyspnea on exertion, hemoptysis,              

      orthopnea, pleurisy, shortness of breath, sputum production, wheezing.                      

12:41 Abdomen/GI: Positive for diarrhea, Negative for abdominal pain, nausea and vomiting.    kb  

                                                                                                  

Exam:                                                                                             

12:41 Constitutional:  This is a well developed, well nourished patient who is awake, alert,  kb  

      and in no acute distress. Head/Face:  Normocephalic, atraumatic. ENT:  Nares patent. No     

      nasal discharge, no septal abnormalities noted.  Tympanic membranes are normal and          

      external auditory canals are clear.  Oropharynx with no redness, swelling, or masses,       

      exudates, or evidence of obstruction, uvula midline.  Mucous membranes moist. Neck:         

      Trachea midline, no thyromegaly or masses palpated, and no cervical lymphadenopathy.        

      Supple, full range of motion without nuchal rigidity, or vertebral point tenderness.        

      No Meningismus. Chest/axilla:  Normal chest wall appearance and motion.  Nontender with     

      no deformity.  No lesions are appreciated. Cardiovascular:  Regular rate and rhythm         

      with a normal S1 and S2.  No gallops, murmurs, or rubs.  Normal PMI, no JVD.  No pulse      

      deficits. Abdomen/GI:  Soft, non-tender, with normal bowel sounds.  No distension or        

      tympany.  No guarding or rebound.  No evidence of tenderness throughout. Back:  No          

      spinal tenderness.  No costovertebral tenderness.  Full range of motion. Skin:  Warm,       

      dry with normal turgor.  Normal color with no rashes, no lesions, and no evidence of        

      cellulitis. MS/ Extremity:  Pulses equal, no cyanosis.  Neurovascular intact.  Full,        

      normal range of motion. Neuro:  Awake and alert, GCS 15, oriented to person, place,         

      time, and situation.  Cranial nerves II-XII grossly intact.  Motor strength 5/5 in all      

      extremities.  Sensory grossly intact.  Cerebellar exam normal.  Normal gait.                

12:41 Respiratory: the patient does not display signs of respiratory distress,  Respirations:     

      normal, Breath sounds: wheezing: expiratory that is mild, is scattered.                     

                                                                                                  

Vital Signs:                                                                                      

12:35  / 102; Pulse 113; Resp 18; Temp 97.8; Pulse Ox 98% on R/A; Weight 108.86 kg;     la1 

      Height 5 ft. 2 in. (157.48 cm);                                                             

12:35 Body Mass Index 43.90 (108.86 kg, 157.48 cm)                                            la1 

                                                                                                  

MDM:                                                                                              

12:36 Patient medically screened.                                                             kb  

12:40 Data reviewed: vital signs, nurses notes. Data interpreted: Pulse oximetry: on room air kb  

      is 98 %. Interpretation: normal.                                                            

13:27 Counseling: I had a detailed discussion with the patient and/or guardian regarding: the kb  

      historical points, exam findings, and any diagnostic results supporting the                 

      discharge/admit diagnosis, lab results, the need for outpatient follow up, a family         

      practitioner, to return to the emergency department if symptoms worsen or persist or if     

      there are any questions or concerns that arise at home.                                     

                                                                                                  

                                                                                             

12:37 Order name: Flu; Complete Time: 13:17                                                   iw  

                                                                                             

12:37 Order name: Strep; Complete Time: 13:02                                                 iw  

                                                                                             

13:03 Order name: Throat Culture                                                              EDMS

                                                                                                  

Administered Medications:                                                                         

12:44 Drug: DuoNeb (3:1) (2.5 mg - 0.5 mg) 3 ml Route: Nebulizer;                             iw  

                                                                                                  

                                                                                                  

Disposition:                                                                                      

18:50 Co-signature as Attending Physician, Shoaib Grijalva MD Available for consultation at    ps1 

      all times . Chart complete.                                                                 

                                                                                                  

Disposition:                                                                                      

19 13:27 Discharged to Home. Impression: Acute upper respiratory infection, unspecified.    

- Condition is Stable.                                                                            

- Discharge Instructions: Upper Respiratory Infection, Adult, Easy-to-Read.                       

- Prescriptions for Albuterol Sulfate 90 mcg/actuation - inhale 1-2 puff by INHALATION            

  route every 4-6 hours; 1 Inhaler.                                                               

- Medication Reconciliation Form, Thank You Letter, Antibiotic Education, Prescription            

  Opioid Use form.                                                                                

- Follow up: Emergency Department; When: As needed; Reason: Worsening of condition.               

  Follow up: Private Physician; When: 2 - 3 days; Reason: Recheck today's complaints,             

  Continuance of care, Re-evaluation by your physician.                                           

                                                                                                  

                                                                                                  

                                                                                                  

Signatures:                                                                                       

Dispatcher [employer]                           EDAnum Keane, ROSALINDP-C                 FNP-Ckb                                                   

Fide Hope, RN                     Mor Mares RN                         RN   laShoaib Parada MD MD   ps1                                                  

                                                                                                  

Corrections: (The following items were deleted from the chart)                                    

13:35 13:27 2019 13:27 Discharged to Home. Impression: Acute upper respiratory          iw  

      infection, unspecified. Condition is Stable. Forms are Medication Reconciliation Form,      

      Thank You Letter, Antibiotic Education, Prescription Opioid Use. Follow up: Emergency       

      Department; When: As needed; Reason: Worsening of condition. Follow up: Private             

      Physician; When: 2 - 3 days; Reason: Recheck today's complaints, Continuance of care,       

      Re-evaluation by your physician. kb                                                         

                                                                                                  

**************************************************************************************************

## 2019-02-02 NOTE — ER
Nurse's Notes                                                                                     

 [facility]                                                                

Name: Ebony Haro                                                                            

Age: 49 yrs                                                                                       

Sex: Female                                                                                       

: 1970                                                                                   

MRN: [id number]                                                                                   

Arrival Date: 2019                                                                          

Time: 12:30                                                                                       

Account#: [id number]                                                                            

Bed 12                                                                                            

Private MD:                                                                                       

Diagnosis: Acute upper respiratory infection, unspecified                                         

                                                                                                  

Presentation:                                                                                     

                                                                                             

12:34 Presenting complaint: Patient states: Sore throat and cough for one day, pt reports     la1 

      others at work are sick with similar sx, also reprots diarrhea, denies vomiting.            

      Transition of care: patient was not received from another setting of care. Onset of         

      symptoms was 2019. Risk Assessment: Do you want to hurt yourself or            

      someone else? Patient reports no desire to harm self or others. Initial Sepsis Screen:      

      Does the patient meet any 2 criteria? No. Patient's initial sepsis screen is negative.      

      Does the patient have a suspected source of infection? No. Patient's initial sepsis         

      screen is negative. Care prior to arrival: None.                                            

12:34 Method Of Arrival: Ambulatory                                                           la1 

12:34 Acuity: OLIVER 4                                                                           la1 

                                                                                                  

Triage Assessment:                                                                                

13:30 General: Appears in no apparent distress. Behavior is calm.                             iw  

                                                                                                  

OB/GYN:                                                                                           

13:30 LMP N/A -                                                                               iw  

                                                                                                  

Historical:                                                                                       

- Allergies:                                                                                      

12:35 No Known Allergies;                                                                     la1 

- PMHx:                                                                                           

12:35 None;                                                                                   la1 

                                                                                                  

- Immunization history:: Adult Immunizations up to date.                                          

- Social history:: Smoking status: Patient/guardian denies using tobacco.                         

- Ebola Screening: : No symptoms or risks identified at this time.                                

                                                                                                  

                                                                                                  

Screenin:34 Abuse screen: Denies threats or abuse. Denies injuries from another. Nutritional        iw  

      screening: No deficits noted. Tuberculosis screening: No symptoms or risk factors           

      identified. Fall Risk None identified.                                                      

                                                                                                  

Assessment:                                                                                       

13:00 General: Appears in no apparent distress. Behavior is calm, cooperative. Pain:          iw  

      Complains of pain in throat. Neuro: Level of Consciousness is awake, alert, obeys           

      commands, Oriented to person, place, time, situation, Moves all extremities. Full           

      function. Cardiovascular: Capillary refill < 3 seconds in bilateral fingers.                

      Respiratory: Reports cough that is Airway is patent Respiratory effort is even,             

      unlabored, Breath sounds are clear bilaterally. EENT: Throat is reddened. Derm: Skin is     

      intact, is healthy with good turgor. Musculoskeletal: Range of motion: intact in all        

      extremities.                                                                                

                                                                                                  

Vital Signs:                                                                                      

12:35  / 102; Pulse 113; Resp 18; Temp 97.8; Pulse Ox 98% on R/A; Weight 108.86 kg;     la1 

      Height 5 ft. 2 in. (157.48 cm);                                                             

12:35 Body Mass Index 43.90 (108.86 kg, 157.48 cm)                                            la1 

                                                                                                  

ED Course:                                                                                        

12:30 Patient arrived in ED.                                                                  mr  

12:31 [facility]                                                        kb  

12:31 Shoaib Grijalva MD is Attending Physician.                                             kb  

12:34 Triage completed.                                                                       la1 

12:35 Arm band placed on left wrist.                                                          la1 

12:39 Fide Hope, RN is Primary Nurse.                                                   iw  

13:00 Patient has correct armband on for positive identification.                             iw  

13:34 No provider procedures requiring assistance completed. Patient did not have IV access   iw  

      during this emergency room visit.                                                           

                                                                                                  

Administered Medications:                                                                         

12:44 Drug: DuoNeb (3:1) (2.5 mg - 0.5 mg) 3 ml Route: Nebulizer;                             iw  

                                                                                                  

                                                                                                  

Outcome:                                                                                          

13:27 Discharge ordered by MD.                                                                kb  

13:34 Discharged to home ambulatory.                                                          iw  

13:34 Condition: good                                                                             

13:34 Discharge instructions given to patient, Instructed on discharge instructions, follow       

      up and referral plans. Demonstrated understanding of instructions, follow-up care,          

      medications, Prescriptions given X 1.                                                       

13:35 Patient left the ED.                                                                    iw  

                                                                                                  

Signatures:                                                                                       

Anum Alejandro FNP-C FNP-Ckb Rivera, Mary                                 mr                                                   

Fide Hope, RN                     NANO   iw                                                   

Mor Deras RN                         RN   la                                                  

                                                                                                  

**************************************************************************************************

## 2020-01-22 ENCOUNTER — HOSPITAL ENCOUNTER (EMERGENCY)
Dept: HOSPITAL 97 - ER | Age: 50
Discharge: HOME | End: 2020-01-22
Payer: SELF-PAY

## 2020-01-22 DIAGNOSIS — M54.5: Primary | ICD-10-CM

## 2020-01-22 PROCEDURE — 81003 URINALYSIS AUTO W/O SCOPE: CPT

## 2020-01-22 PROCEDURE — 99282 EMERGENCY DEPT VISIT SF MDM: CPT

## 2020-01-22 PROCEDURE — 81025 URINE PREGNANCY TEST: CPT

## 2020-01-22 NOTE — ER
Nurse's Notes                                                                                     

 [facility]                                                                 

Name: Ebony Haro                                                                            

Age: 50 yrs                                                                                       

Sex: Female                                                                                       

: 1970                                                                                   

MRN: [id number]                                                                                   

Arrival Date: 2020                                                                          

Time: 19:                                                                                       

Account#: [id number]                                                                            

Bed 19                                                                                            

Private MD:                                                                                       

Diagnosis: Low back pain                                                                          

                                                                                                  

Presentation:                                                                                     

                                                                                             

20:01 Presenting complaint: Patient states: Crampy pain that starts in the middle of the low  ca1 

      back that radiates to the R and L lower abdominal area. Intermittent and began a couple     

      a days ago. Denies N/V/Diarrhea. Denies urinary symptom and fever. Transition of care:      

      patient was not received from another setting of care. Onset of symptoms was 2020. Risk Assessment: Do you want to hurt yourself or someone else? Patient            

      reports no desire to harm self or others. Initial Sepsis Screen: Does the patient meet      

      any 2 criteria? No. Patient's initial sepsis screen is negative. Does the patient have      

      a suspected source of infection? No. Patient's initial sepsis screen is negative. Care      

      prior to arrival: None.                                                                     

20:01 Method Of Arrival: Ambulatory                                                           ca1 

20:01 Acuity: OLIVRE 3                                                                           ca1 

                                                                                                  

OB/GYN:                                                                                           

20:05 LMP 2019                                                                          ca1 

                                                                                                  

Historical:                                                                                       

- Allergies:                                                                                      

20:05 No Known Allergies;                                                                     ca1 

- Home Meds:                                                                                      

20:05 None [Active];                                                                          ca1 

- PMHx:                                                                                           

20:05 None;                                                                                   ca1 

- PSHx:                                                                                           

20:05 Tubal ligation;                                                                         ca1 

                                                                                                  

- Immunization history:: Adult Immunizations up to date, Flu vaccine is not up to date.           

- Social history:: Smoking status: Patient denies any tobacco usage or history of.                

- Ebola Screening: : Patient negative for fever greater than or equal to 101.5 degrees            

  Fahrenheit, and additional compatible Ebola Virus Disease symptoms Patient denies               

  exposure to infectious person Patient denies travel to an Ebola-affected area in the            

  21 days before illness onset No symptoms or risks identified at this time.                      

                                                                                                  

                                                                                                  

Screenin:51 Abuse screen: Denies threats or abuse. Nutritional screening: No deficits noted.        ea  

      Tuberculosis screening: No symptoms or risk factors identified. Fall Risk None              

      identified.                                                                                 

                                                                                                  

Assessment:                                                                                       

20:15 General: Appears uncomfortable, Behavior is calm, cooperative, appropriate for age.     ea  

      Pain: Complains of pain in abdomen. Neuro: Level of Consciousness is awake, alert,          

      obeys commands, Oriented to person, place, time, situation. Cardiovascular: Patient's       

      skin is warm and dry. Respiratory: Airway is patent Respiratory effort is even,             

      unlabored, Respiratory pattern is regular, symmetrical. Derm: Skin is pink, warm \T\ dry.   

21:42 Reassessment: Patient and/or family updated on plan of care and expected duration. Pain ea  

      level reassessed. Patient is alert, oriented x 3, equal unlabored respirations, skin        

      warm/dry/pink. Discharge instruction given to patient, verbalized the understanding of      

      instruction. Pt left ED ambulatory accompanied by family.                                   

                                                                                                  

Vital Signs:                                                                                      

20:05  / 82; Pulse 87; Resp 18 S; Temp 98.1(O); Pulse Ox 100% on R/A; Weight 127.01 kg  ca1 

      (R); Height 5 ft. 2 in. (157.48 cm); Pain 2/10;                                             

20:05 Body Mass Index 51.21 (127.01 kg, 157.48 cm)                                            ca1 

                                                                                                  

ED Course:                                                                                        

19:22 Patient arrived in ED.                                                                  mr  

20:01 Nydia Benson, RN is Primary Nurse.                                                      ca1 

20:04 Triage completed.                                                                       ca1 

20:05 Arm band placed on right wrist.                                                         ca1 

20:28 [facility]                                                             la1 

20:28 Cali Gray MD is Attending Physician.                                            la1 

20:51 Patient has correct armband on for positive identification. Placed in gown. Bed in low  ea  

      position. Call light in reach.                                                              

21:42 No provider procedures requiring assistance completed. Patient did not have IV access   ea  

      during this emergency room visit.                                                           

                                                                                                  

Administered Medications:                                                                         

No medications were administered                                                                  

                                                                                                  

                                                                                                  

Outcome:                                                                                          

21:32 Discharge ordered by MD.                                                                la1 

21:43 Discharged to home ambulatory, with family.                                             ea  

21:43 Condition: stable                                                                           

21:43 Discharge instructions given to patient, Instructed on discharge instructions, follow       

      up and referral plans. medication usage, Demonstrated understanding of instructions,        

      follow-up care, medications, Prescriptions given X 1.                                       

21:44 Patient left the ED.                                                                    ea  

                                                                                                  

Signatures:                                                                                       

Rudy Neyda                                 mr                                                   

Mor Deras FNP-C                      FNP-Cla1                                                  

Aliya Rivera RN RN   ea                                                   

Nydia Benson RN RN   ca1                                                  

                                                                                                  

**************************************************************************************************

## 2020-01-22 NOTE — EDPHYS
Physician Documentation                                                                           

 [facility]                                                                 

Name: Ebony Haro                                                                            

Age: 50 yrs                                                                                       

Sex: Female                                                                                       

: 1970                                                                                   

MRN: [id number]                                                                                   

Arrival Date: 2020                                                                          

Time: :                                                                                       

Account#: [id number]                                                                            

Bed 19                                                                                            

Private MD:                                                                                       

ED Physician Cali Gray                                                                     

HPI:                                                                                              

                                                                                             

21:15 This 50 yrs old  Female presents to ER via Ambulatory with complaints of Back   la1 

      Pain, Abdominal Pain.                                                                       

21:15 The patient presents with pain that is acute. The symptoms are located in the low back. la1 

      Onset: The symptoms/episode began/occurred 4 day(s) ago. The pain does not radiate.         

      Associated signs and symptoms: Pertinent negatives: fever, headache, hematuria,             

      incontinence, nausea, numbness, tingling, urinary retention, vomiting, weakness. The        

      problem was sustained from unknown cause. Modifying factors: The patient symptoms are       

      alleviated by nothing. Severity of symptoms: At their worst the symptoms were moderate.     

      The patient has not experienced similar symptoms in the past.                               

                                                                                                  

OB/GYN:                                                                                           

20:05 LMP 2019                                                                          ca1 

                                                                                                  

Historical:                                                                                       

- Allergies:                                                                                      

20:05 No Known Allergies;                                                                     ca1 

- Home Meds:                                                                                      

20:05 None [Active];                                                                          ca1 

- PMHx:                                                                                           

20:05 None;                                                                                   ca1 

- PSHx:                                                                                           

20:05 Tubal ligation;                                                                         ca1 

                                                                                                  

- Immunization history:: Adult Immunizations up to date, Flu vaccine is not up to date.           

- Social history:: Smoking status: Patient denies any tobacco usage or history of.                

- Ebola Screening: : Patient negative for fever greater than or equal to 101.5 degrees            

  Fahrenheit, and additional compatible Ebola Virus Disease symptoms Patient denies               

  exposure to infectious person Patient denies travel to an Ebola-affected area in the            

  21 days before illness onset No symptoms or risks identified at this time.                      

                                                                                                  

                                                                                                  

ROS:                                                                                              

21:20 Constitutional: Negative for fever, chills, and weight loss, Eyes: Negative for injury, la1 

      pain, redness, and discharge, ENT: Negative for injury, pain, and discharge, Neck:          

      Negative for injury, pain, and swelling, Cardiovascular: Negative for chest pain,           

      palpitations, and edema, Respiratory: Negative for shortness of breath, cough,              

      wheezing, and pleuritic chest pain, Abdomen/GI: Negative for abdominal pain, nausea,        

      vomiting, diarrhea, and constipation, : Negative for injury, bleeding, discharge, and     

      swelling, MS/Extremity: Negative for injury and deformity, Skin: Negative for injury,       

      rash, and discoloration, Neuro: Negative for headache, weakness, numbness, tingling,        

      and seizure.                                                                                

21:20 Back: Positive for pain at rest.                                                            

                                                                                                  

Exam:                                                                                             

21:21 Constitutional:  This is a well developed, well nourished patient who is awake, alert,  la1 

      and in no acute distress. Eyes:   Periorbital areas with no swelling, redness, or           

      edema. ENT:   Mucous membranes moist. Neck:  Trachea midline,  Chest/axilla:  Normal        

      chest wall appearance and motion.  Nontender with no deformity.  No lesions are             

      appreciated. Cardiovascular:  Regular rate and rhythm with a normal S1 and S2.  No          

      gallops, murmurs, or rubs.  Normal PMI, no JVD.  No pulse deficits. Respiratory:  Lungs     

      have equal breath sounds bilaterally, clear to auscultation No rales, rhonchi or            

      wheezes noted.  No increased work of breathing, no retractions or nasal flaring.            

      Abdomen/GI:  Soft, non-tender, with normal bowel sounds.  No distension or tympany.  No     

      guarding or rebound.  No evidence of tenderness throughout. Back:  No spinal                

      tenderness.  No costovertebral tenderness.  Full range of motion. MS/ Extremity:            

      Pulses equal, no cyanosis.  Neurovascular intact.  Full, normal range of motion.            

21:32 Neuro: Orientation: is normal, Mentation: is normal, Memory: is normal, Cerebellar      la1 

      function: is grossly normal, Gait: is steady.                                               

                                                                                                  

Vital Signs:                                                                                      

20:05  / 82; Pulse 87; Resp 18 S; Temp 98.1(O); Pulse Ox 100% on R/A; Weight 127.01 kg  ca1 

      (R); Height 5 ft. 2 in. (157.48 cm); Pain 2/10;                                             

20:05 Body Mass Index 51.21 (127.01 kg, 157.48 cm)                                            ca1 

                                                                                                  

MDM:                                                                                              

20:28 Patient medically screened.                                                             la1 

21:30 Data reviewed: vital signs, nurses notes, I have discussed the patient's                la1 

      presentation/case with the attending Emergency Department Physician; and as a result, I     

      will discharge patient. Data interpreted: Pulse oximetry: on room air is 100 %.             

      Interpretation: normal. Test interpretation: by ED physician or midlevel provider:.         

      Counseling: I had a detailed discussion with the patient and/or guardian regarding: the     

      historical points, exam findings, and any diagnostic results supporting the                 

      discharge/admit diagnosis, lab results, the need for outpatient follow up, a family         

      practitioner, to return to the emergency department if symptoms worsen or persist or if     

      there are any questions or concerns that arise at home. ED course: Pt reports               

      intermittent, mild pain in lower back, denies any systemic symptoms, states pain is         

      very mild and her doctor thinks it is menopause but she wanted to get checked out           

      again, no abd tenderness, abd soft, no back tenderness.                                     

21:32 ED course: informed pt of blood in urine and need for FU in that regard.                [facility] 

                                                                                                  

                                                                                             

21:17 Order name: Urine Dipstick--Ancillary (enter results)                                   [facility] 

                                                                                             

21:17 Order name: Urine Pregnancy--Ancillary (enter results)                                  [facility] 

                                                                                             

20:43 Order name: Urine Dipstick-Ancillary (obtain specimen); Complete Time: 21:18            [facility] 

                                                                                             

20:43 Order name: Urine Pregnancy Test (obtain specimen); Complete Time: 21:18                [facility] 

                                                                                             

21:17 Order name: Urine Dipstick-Ancillary                                                    [facility]

                                                                                             

21:17 Order name: Urine Pregnancy--Ancillary                                                  [facility]

                                                                                                  

Administered Medications:                                                                         

No medications were administered                                                                  

                                                                                                  

                                                                                                  

Disposition:                                                                                      

                                                                                             

08:10 Co-signature as Attending Physician, Cali Gray MD I agree with the assessment and tw4 

      plan of care.                                                                               

                                                                                                  

Disposition:                                                                                      

20 21:32 Discharged to Home. Impression: Low back pain.                                     

- Condition is Stable.                                                                            

- Discharge Instructions: Back Pain, Adult, Musculoskeletal Pain, Back Exercises,                 

  Easy-to-Read.                                                                                   

- Prescriptions for Cyclobenzaprine 10 mg Oral Tablet - take 1 tablet by ORAL route               

  every 8 hours As needed; 30 tablet.                                                             

- Medication Reconciliation Form, Thank You Letter form.                                          

- Follow up: Private Physician; When: 2 - 3 days; Reason: Recheck today's complaints,             

  Re-evaluation by your physician.                                                                

- Problem is new.                                                                                 

- Symptoms are unchanged.                                                                         

                                                                                                  

                                                                                                  

                                                                                                  

Signatures:                                                                                       

Dispatcher [employer]                           EDMS                                                 

Mor Deras, [id number]                      FNP-Cla1                                                  

Aliya Rivera RN RN ea Wadley, Terrence, MD MD   4                                                  

Nydia Benson RN                        RN   ca1                                                  

                                                                                                  

Corrections: (The following items were deleted from the chart)                                    

                                                                                             

21:44 21:32 2020 21:32 Discharged to Home. Impression: Low back pain. Condition is      ea  

      Stable. Forms are Medication Reconciliation Form, Thank You Letter, Antibiotic              

      Education, Prescription Opioid Use. Follow up: Private Physician; When: 2 - 3 days;         

      Reason: Recheck today's complaints, Re-evaluation by your physician. Problem is new.        

      Symptoms are unchanged. la1                                                                 

                                                                                                  

**************************************************************************************************

## 2020-01-23 VITALS — SYSTOLIC BLOOD PRESSURE: 148 MMHG | DIASTOLIC BLOOD PRESSURE: 82 MMHG | TEMPERATURE: 98.1 F | OXYGEN SATURATION: 100 %

## 2020-08-15 ENCOUNTER — HOSPITAL ENCOUNTER (EMERGENCY)
Dept: HOSPITAL 97 - ER | Age: 50
Discharge: HOME | End: 2020-08-15
Payer: SELF-PAY

## 2020-08-15 VITALS — DIASTOLIC BLOOD PRESSURE: 86 MMHG | OXYGEN SATURATION: 100 % | SYSTOLIC BLOOD PRESSURE: 129 MMHG

## 2020-08-15 VITALS — TEMPERATURE: 97.1 F

## 2020-08-15 DIAGNOSIS — I10: Primary | ICD-10-CM

## 2020-08-15 PROCEDURE — 99283 EMERGENCY DEPT VISIT LOW MDM: CPT

## 2020-08-15 NOTE — ER
Nurse's Notes                                                                                     

 [facility]                                                                 

Name: Ebony Haro                                                                            

Age: 50 yrs                                                                                       

Sex: Female                                                                                       

: 1970                                                                                   

MRN: [id number]                                                                                   

Arrival Date: 08/15/2020                                                                          

Time: 19:15                                                                                       

Account#: [id number]                                                                            

Bed 8                                                                                             

Private MD:                                                                                       

Diagnosis: Dizziness and giddiness;Hypertension to be determined                                  

                                                                                                  

Presentation:                                                                                     

08/15                                                                                             

19:27 Chief complaint: Patient states: Dizziness that began about 45 mins ago, pt denies      sg  

      headache,denies N/V/D/FEVER/CHILLS, denies any changes with vision, denies any              

      neurological symptoms at this time. Coronavirus screen: Client denies travel out of the     

      [place] in the last 14 days. At this time, the client does not indicate any symptoms           

      associated with coronavirus-19. Ebola Screen: Patient negative for fever greater than       

      or equal to 101.5 degrees Fahrenheit, and additional compatible Ebola Virus Disease         

      symptoms Patient denies exposure to infectious person. Patient denies travel to an          

      Ebola-affected area in the 21 days before illness onset. No symptoms or risks               

      identified at this time. Initial Sepsis Screen: Does the patient meet any 2 criteria?       

      No. Patient's initial sepsis screen is negative. Does the patient have a suspected          

      source of infection? No. Patient's initial sepsis screen is negative. Risk Assessment:      

      Do you want to hurt yourself or someone else? Patient reports no desire to harm self or     

      others. Onset of symptoms was August 15, 2020. Care prior to arrival: None. Transition      

      of care: patient was not received from another setting of care.                             

19:27 Method Of Arrival: Wheelchair                                                           sg  

19:27 Acuity: OLIVER 3                                                                           sg  

                                                                                                  

Historical:                                                                                       

- Allergies:                                                                                      

19:30 No Known Allergies;                                                                     sg  

- Home Meds:                                                                                      

19:30 None [Active];                                                                          sg  

- PMHx:                                                                                           

19:30 None;                                                                                   sg  

- PSHx:                                                                                           

19:30 Tubal ligation;                                                                         sg  

                                                                                                  

- Immunization history:: Adult Immunizations up to date.                                          

- Social history:: Smoking status: Patient denies any tobacco usage or history of.                

                                                                                                  

                                                                                                  

Screenin:50 Abuse screen: Denies threats or abuse. Denies injuries from another. Nutritional        lp1 

      screening: No deficits noted. Tuberculosis screening: No symptoms or risk factors           

      identified. Fall Risk None identified.                                                      

                                                                                                  

Assessment:                                                                                       

19:49 General: Appears in no apparent distress. Behavior is calm, cooperative, appropriate    lp1 

      for age. Pain: Denies pain. Neuro: Level of Consciousness is awake, alert, obeys            

      commands, Oriented to person, place, time, situation, Reports dizziness, since this         

      morning. Cardiovascular: Patient's skin is warm and dry. Respiratory: Respiratory           

      effort is even, unlabored. GI: No signs and/or symptoms were reported involving the         

      gastrointestinal system. : No signs and/or symptoms were reported regarding the           

      genitourinary system. EENT: No signs and/or symptoms were reported regarding the EENT       

      system. Derm: Skin is pink, warm \T\ dry. Musculoskeletal: No deficits noted.               

20:03 Reassessment: Patient and/or family updated on plan of care and expected duration. Pain jb4 

      level reassessed. Patient is alert, oriented x 3, equal unlabored respirations, skin        

      warm/dry/pink. PT reports feeling better after medication administration. Reports           

      dizziness has improved. Is ambulating with steady gait to the restroom.                     

20:30 Reassessment: Patient is alert, oriented x 3, equal unlabored respirations, skin        lp1 

      warm/dry/pink. Patient states feeling better.                                               

                                                                                                  

Vital Signs:                                                                                      

19:27  / 94; Pulse 89; Resp 20; Temp 97.1; Pulse Ox 97% on R/A; Weight 129.73 kg (R);   sg  

      Height 5 ft. 0 in. (152.40 cm) (R); Pain 0/10;                                              

19:49  / 95; Pulse 77; Resp 18; Pulse Ox 100% on R/A;                                   lp1 

21:00  / 83; Pulse 80; Resp 18; Pulse Ox 99% on R/A;                                    lp1 

21:15  / 86; Pulse 72; Resp 18; Pulse Ox 100% on R/A;                                   lp1 

19:27 Body Mass Index 55.85 (129.73 kg, 152.40 cm)                                            sg  

                                                                                                  

ED Course:                                                                                        

19:15 Patient arrived in ED.                                                                  cl3 

19:27 Cali Gray MD is Attending Physician.                                            tw4 

19:29 Triage completed.                                                                       sg  

19:30 Tonny Cox, RN is Primary Nurse.                                                     jb4 

19:30 Arm band placed on.                                                                     sg  

19:50 Chiara Frances, RN is Primary Nurse.                                                       lp1 

19:50 Patient has correct armband on for positive identification.                             lp1 

21:00 No provider procedures requiring assistance completed. Patient did not have IV access   lp1 

      during this emergency room visit.                                                           

21:11 Anastasiya Schulz DO is Referral Physician.                                              tw4 

21:11 Ángel Odom MD is Referral Physician.                                               tw4 

21:12 Jerri Vasquez MD is Referral Physician.                                                     tw4 

                                                                                                  

Administered Medications:                                                                         

19:49 Drug: cloNIDine 0.1 mg Route: PO;                                                       lp1 

                                                                                                  

                                                                                                  

Outcome:                                                                                          

21:12 Discharge ordered by MD.                                                                tw4 

21:25 Discharged to home ambulatory, with family.                                             jb4 

21:25 Condition: stable                                                                           

21:25 Discharge instructions given to patient, Instructed on discharge instructions, follow       

      up and referral plans. medication usage, Demonstrated understanding of instructions,        

      follow-up care, medications, Prescriptions given X 1.                                       

21:36 Patient left the ED.                                                                    jb4 

                                                                                                  

Signatures:                                                                                       

Joshua Quevedo, RN                         RN   sg                                                   

Chiara Frances RN                         RN   lp1                                                  

Tonny Cox RN                       RN   jb4                                                  

Cali Gray MD MD   tw4                                                  

Patrice Haas                                cl3                                                  

                                                                                                  

**************************************************************************************************

## 2020-08-15 NOTE — EDPHYS
Physician Documentation                                                                           

 [facility]                                                                 

Name: Ebony Haro                                                                            

Age: 50 yrs                                                                                       

Sex: Female                                                                                       

: 1970                                                                                   

MRN: [id number]                                                                                   

Arrival Date: 08/15/2020                                                                          

Time: 19:15                                                                                       

Account#: [id number]                                                                            

Bed 8                                                                                             

Private MD:                                                                                       

ED Physician Cali Gray                                                                     

HPI:                                                                                              

                                                                                             

00:25 This 50 yrs old  Female presents to ER via Wheelchair with complaints of        tw4 

      Dizziness.                                                                                  

00:25 The patient presents with dizziness. Onset: The symptoms/episode began/occurred today.  tw4 

      Context: occurred at home, occurred while the patient was standing, just prior to the       

      episode the patient experienced no apparent symptoms. Modifying factors: The symptoms       

      are alleviated by nothing, the symptoms are aggravated by nothing. Associated signs and     

      symptoms: The patient has no apparent associated signs or symptoms. Severity of             

      symptoms: At their worst the symptoms were moderate in the emergency department the         

      symptoms are unchanged. The patient has not experienced similar symptoms in the past.       

                                                                                                  

Historical:                                                                                       

- Allergies:                                                                                      

08/15                                                                                             

19:30 No Known Allergies;                                                                     sg  

- Home Meds:                                                                                      

19:30 None [Active];                                                                          sg  

- PMHx:                                                                                           

19:30 None;                                                                                   sg  

- PSHx:                                                                                           

19:30 Tubal ligation;                                                                         sg  

                                                                                                  

- Immunization history:: Adult Immunizations up to date.                                          

- Social history:: Smoking status: Patient denies any tobacco usage or history of.                

                                                                                                  

                                                                                                  

ROS:                                                                                              

                                                                                             

00:25 Constitutional: Negative for fever, chills, and weight loss, Eyes: Negative for injury, tw4 

      pain, redness, and discharge, Cardiovascular: Negative for chest pain, palpitations,        

      and edema, Respiratory: Negative for shortness of breath, cough, wheezing, and              

      pleuritic chest pain, Abdomen/GI: Negative for abdominal pain, nausea, vomiting,            

      diarrhea, and constipation, Back: Negative for injury and pain, MS/Extremity: Negative      

      for injury and deformity, Skin: Negative for injury, rash, and discoloration.               

      Neuro: Positive for dizziness, Negative for altered mental status, gait disturbance,        

      headache, hearing loss, loss of consciousness, numbness, seizure activity.                  

                                                                                                  

Exam:                                                                                             

00:25 Constitutional:  This is a well developed, well nourished patient who is awake, alert,  tw4 

      and in no acute distress. Head/Face:  Normocephalic, atraumatic. Chest/axilla:  Normal      

      chest wall appearance and motion.  Nontender with no deformity.  No lesions are             

      appreciated. Cardiovascular:  Regular rate and rhythm with a normal S1 and S2.  No          

      gallops, murmurs, or rubs.  Normal PMI, no JVD.  No pulse deficits. Respiratory:  Lungs     

      have equal breath sounds bilaterally, clear to auscultation and percussion.  No rales,      

      rhonchi or wheezes noted.  No increased work of breathing, no retractions or nasal          

      flaring. Abdomen/GI:  Soft, non-tender, with normal bowel sounds.  No distension or         

      tympany.  No guarding or rebound.  No evidence of tenderness throughout. Back:  No          

      spinal tenderness.  No costovertebral tenderness.  Full range of motion. MS/ Extremity:     

       Pulses equal, no cyanosis.  Neurovascular intact.  Full, normal range of motion.           

      Neuro:  Awake and alert, GCS 15, oriented to person, place, time, and situation.            

      Cranial nerves II-XII grossly intact.  Motor strength 5/5 in all extremities.  Sensory      

      grossly intact.  Cerebellar exam normal.  Normal gait.                                      

                                                                                                  

Vital Signs:                                                                                      

08/15                                                                                             

19:27  / 94; Pulse 89; Resp 20; Temp 97.1; Pulse Ox 97% on R/A; Weight 129.73 kg (R);   sg  

      Height 5 ft. 0 in. (152.40 cm) (R); Pain 0/10;                                              

19:49  / 95; Pulse 77; Resp 18; Pulse Ox 100% on R/A;                                   lp1 

21:00  / 83; Pulse 80; Resp 18; Pulse Ox 99% on R/A;                                    lp1 

21:15  / 86; Pulse 72; Resp 18; Pulse Ox 100% on R/A;                                   lp1 

19:27 Body Mass Index 55.85 (129.73 kg, 152.40 cm)                                              

                                                                                                  

MDM:                                                                                              

19:34 Patient medically screened.                                                             tw4 

                                                                                             

00:28 Differential diagnosis: cardiac arrhythmia, CVA, generalized weakness. Data reviewed:   tw4 

      vital signs, nurses notes. Data interpreted: Pulse oximetry: Interpretation: normal.        

      Counseling: I had a detailed discussion with the patient and/or guardian regarding: the     

      historical points, exam findings, and any diagnostic results supporting the                 

      discharge/admit diagnosis. Medication response: clonidine reduced the patient's             

      elevated blood pressure to within acceptable limits. Response to treatment: the             

      patient's symptoms have resolved after treatment, the patient's condition has returned      

      to base line, the patient is now symptom free, and as a result, I will discharge            

      patient. Special discussion: I discussed with the patient/guardian in detail that at        

      this point there is no indication for admission to the hospital. It is understood,          

      however, that if the symptoms persist or worsen the patient needs to return immediately     

      for re-evaluation.                                                                          

                                                                                                  

Administered Medications:                                                                         

08/15                                                                                             

19:49 Drug: cloNIDine 0.1 mg Route: PO;                                                       lp1 

                                                                                                  

                                                                                                  

Disposition:                                                                                      

08/15/20 21:12 Discharged to Home. Impression: Dizziness and giddiness, Hypertension to be        

  determined.                                                                                     

- Condition is Stable.                                                                            

- Discharge Instructions: Dizziness, Hypertension.                                                

- Prescriptions for Norvasc 5 mg Oral Tablet - take 1 tablet by ORAL route once daily;            

  20 tablet.                                                                                      

- Work release form, Medication Reconciliation Form, Thank You Letter, Antibiotic                 

  Education, Prescription Opioid Use form.                                                        

- Follow up: Private Physician; When: Upon discharge from the Emergency Department;               

  Reason: Recheck today's complaints, Continuance of care, Re-evaluation by your                  

  physician. Follow up: Anastasiya Schulz DO; When: 1 - 2 days; Reason: Recheck today's            

  complaints, Continuance of care, Re-evaluation by your physician. Follow up: Ángel Odom MD; When: 1 - 2 days; Reason: Recheck today's complaints, Continuance of               

  care, Re-evaluation by your physician. Follow up: Jerri Vasquez MD; When: 1 - 2 days;               

  Reason: Recheck today's complaints, Continuance of care, Re-evaluation by your                  

  physician.                                                                                      

- Problem is new.                                                                                 

- Symptoms have improved.                                                                         

                                                                                                  

                                                                                                  

                                                                                                  

Signatures:                                                                                       

Joshua Quevedo RN                         RN   sg                                                   

Chiara Frances RN                         RN   lp1                                                  

Tonny Cox RN                       RN   jb4                                                  

Cali Gray MD MD   tw4                                                  

                                                                                                  

Corrections: (The following items were deleted from the chart)                                    

21:36 21:12 08/15/2020 21:12 Discharged to Home. Impression: Dizziness and giddiness;         jb4 

      Hypertension to be determined. Condition is Stable. Forms are Medication Reconciliation     

      Form, Thank You Letter, Antibiotic Education, Prescription Opioid Use. Follow up:           

      Private Physician; When: Upon discharge from the Emergency Department; Reason: Recheck      

      today's complaints, Continuance of care, Re-evaluation by your physician. Follow up:        

      Anastasiya Schulz; When: 1 - 2 days; Reason: Recheck today's complaints, Continuance of        

      care, Re-evaluation by your physician. Follow up: Ángel Odom; When: 1 - 2 days;          

      Reason: Recheck today's complaints, Continuance of care, Re-evaluation by your              

      physician. Follow up: Jerri Vasquez; When: 1 - 2 days; Reason: Recheck today's complaints,        

      Continuance of care, Re-evaluation by your physician. Problem is new. Symptoms have         

      improved. tw4                                                                               

                                                                                                  

**************************************************************************************************

## 2021-04-23 ENCOUNTER — HOSPITAL ENCOUNTER (OUTPATIENT)
Dept: HOSPITAL 97 - ER | Age: 51
Setting detail: OBSERVATION
LOS: 1 days | Discharge: HOME | End: 2021-04-24
Attending: HOSPITALIST | Admitting: FAMILY MEDICINE
Payer: SELF-PAY

## 2021-04-23 VITALS — OXYGEN SATURATION: 97 %

## 2021-04-23 VITALS — BODY MASS INDEX: 51.4 KG/M2

## 2021-04-23 DIAGNOSIS — Z20.822: ICD-10-CM

## 2021-04-23 DIAGNOSIS — E66.9: ICD-10-CM

## 2021-04-23 DIAGNOSIS — Z79.899: ICD-10-CM

## 2021-04-23 DIAGNOSIS — I10: ICD-10-CM

## 2021-04-23 DIAGNOSIS — E78.5: ICD-10-CM

## 2021-04-23 DIAGNOSIS — R07.89: Primary | ICD-10-CM

## 2021-04-23 LAB
ALBUMIN SERPL BCP-MCNC: 4.3 G/DL (ref 3.4–5)
ALP SERPL-CCNC: 74 U/L (ref 45–117)
ALT SERPL W P-5'-P-CCNC: 23 U/L (ref 12–78)
AST SERPL W P-5'-P-CCNC: 15 U/L (ref 15–37)
BUN BLD-MCNC: 13 MG/DL (ref 7–18)
GLUCOSE SERPLBLD-MCNC: 88 MG/DL (ref 74–106)
HCT VFR BLD CALC: 45.1 % (ref 36–45)
INR BLD: 0.96
LYMPHOCYTES # SPEC AUTO: 1.8 K/UL (ref 0.7–4.9)
MAGNESIUM SERPL-MCNC: 2.1 MG/DL (ref 1.8–2.4)
NT-PROBNP SERPL-MCNC: 50 PG/ML (ref ?–125)
PMV BLD: 7.8 FL (ref 7.6–11.3)
POTASSIUM SERPL-SCNC: 3.7 MMOL/L (ref 3.5–5.1)
RBC # BLD: 5.14 M/UL (ref 3.86–4.86)
TROPONIN (EMERG DEPT USE ONLY): < 0.02 NG/ML (ref 0–0.04)

## 2021-04-23 PROCEDURE — 99285 EMERGENCY DEPT VISIT HI MDM: CPT

## 2021-04-23 PROCEDURE — 85379 FIBRIN DEGRADATION QUANT: CPT

## 2021-04-23 PROCEDURE — 94760 N-INVAS EAR/PLS OXIMETRY 1: CPT

## 2021-04-23 PROCEDURE — 36415 COLL VENOUS BLD VENIPUNCTURE: CPT

## 2021-04-23 PROCEDURE — 80053 COMPREHEN METABOLIC PANEL: CPT

## 2021-04-23 PROCEDURE — 83880 ASSAY OF NATRIURETIC PEPTIDE: CPT

## 2021-04-23 PROCEDURE — 93005 ELECTROCARDIOGRAM TRACING: CPT

## 2021-04-23 PROCEDURE — 80048 BASIC METABOLIC PNL TOTAL CA: CPT

## 2021-04-23 PROCEDURE — 85025 COMPLETE CBC W/AUTO DIFF WBC: CPT

## 2021-04-23 PROCEDURE — 82553 CREATINE MB FRACTION: CPT

## 2021-04-23 PROCEDURE — 83735 ASSAY OF MAGNESIUM: CPT

## 2021-04-23 PROCEDURE — 71045 X-RAY EXAM CHEST 1 VIEW: CPT

## 2021-04-23 PROCEDURE — 84484 ASSAY OF TROPONIN QUANT: CPT

## 2021-04-23 PROCEDURE — 80061 LIPID PANEL: CPT

## 2021-04-23 PROCEDURE — 85610 PROTHROMBIN TIME: CPT

## 2021-04-23 PROCEDURE — 71275 CT ANGIOGRAPHY CHEST: CPT

## 2021-04-23 PROCEDURE — 80076 HEPATIC FUNCTION PANEL: CPT

## 2021-04-23 RX ADMIN — METOPROLOL TARTRATE SCH MG: 50 TABLET, FILM COATED ORAL at 23:00

## 2021-04-23 RX ADMIN — ENOXAPARIN SODIUM SCH MG: 40 INJECTION SUBCUTANEOUS at 23:00

## 2021-04-23 RX ADMIN — Medication SCH ML: at 21:00

## 2021-04-23 NOTE — RAD REPORT
EXAM DESCRIPTION:  RAD - Chest Single View - 4/23/2021 3:14 pm

 

CLINICAL HISTORY:  CHEST PAIN

Chest pain.

 

COMPARISON:  Chest Pa And Lat (2 Views) dated 5/9/2018; CHEST SINGLE VIEW dated 8/2/2012

 

FINDINGS:  Portable technique limits examination quality.

 

The lungs are grossly clear. The heart is normal in size. No displaced fractures.

 

IMPRESSION:  No acute intrathoracic process suspected.

## 2021-04-23 NOTE — RAD REPORT
EXAM DESCRIPTION:  CT - Chest For Pe Angio - 4/23/2021 4:29 pm

 

CLINICAL HISTORY:  Chest pain.

CHEST PAIN

 

COMPARISON:  CTANGIO CHEST FOR PE dated 8/2/2012

 

TECHNIQUE:  CT angiogram of the pulmonary arteries was performed with MIP.

 

All CT scans are performed using dose optimization technique as appropriate and may include automated
 exposure control or mA/KV adjustment according to patient size.

 

FINDINGS:  No evidence of pulmonary thromboembolism.

 

No acute aortic finding demonstrated. Aberrant right subclavian artery is noted.

 

The lungs are clear.

 

No significant pericardial or pleural fluid.

 

No concerning bony finding.

 

IMPRESSION:  No evidence of pulmonary thromboembolism.

## 2021-04-23 NOTE — P.HP
Certification for Inpatient


Patient admitted to: Observation


With expected LOS: <2 Midnights


Practitioner: I am a practitioner with admitting privileges, knowledge of 

patient current condition, hospital course, and medical plan of care.


Services: Services provided to patient in accordance with Admission requirements

found in Title 42 Section 412.3 of the Code of Federal Regulations





Patient History


Date of Service: 04/23/21


Reason for admission: chest pain


History of Present Illness: 





51 yrs old  Female with past medical history of hypertension on 

lisinopril came to the ER with left-sided chest pain which has been going on for

1 day  duration.


Left-sided location associated with no radiation intermittent,  pressure like  

not  associated with any diaphoresis or nausea vomiting.


 denies any shortness of breath


 no fever or chills


 no nausea vomiting or diarrhea


 sick contacts





At the time of interview  patient was noted to have accelerated hypertension and

had a initial workup for chest pain was negative so far and is admitted for ACS 

rule out 

















       


Allergies





No Known Allergies Allergy (Unverified 08/02/12 09:42)


   








Review of Systems


10-point ROS is otherwise unremarkable





Physical Examination





- Vital Signs


Temperature: 98.2 F


Blood Pressure: 178/92


Pulse: 76


Respirations: 18





- Physical Exam


General: Alert, In no apparent distress, Oriented x3, Obese


HEENT: Atraumatic, Normocephalic


Neck: Supple, 2+ carotid pulse no bruit


Respiratory: Clear to auscultation bilaterally, Normal air movement


Cardiovascular: Regular rate/rhythm, Normal S1 S2


Capillary refill: <2 Seconds


Gastrointestinal: Soft and benign, Non-distended


Musculoskeletal: No clubbing, No swelling


Integumentary: No rashes, No breakdown


Neurological: Normal speech, Normal strength at 5/5 x4 extr


Lymphatics: No axilla or inguinal lymphadenopathy





- Studies


Laboratory Data (last 24 hrs)





04/23/21 15:20: PT 11.0, INR 0.96


04/23/21 15:20: WBC 13.30 H, Hgb 14.9, Hct 45.1 H, Plt Count 320


04/23/21 15:20: Sodium 137, Potassium 3.7, BUN 13, Creatinine 0.72, Glucose 88, 

Magnesium 2.1, Total Bilirubin 0.6, AST 15, ALT 23, Alkaline Phosphatase 74








Assessment and Plan





- Problems (Diagnosis)


(1) Chest pain


Current Visit: Yes   Status: Acute   


Plan: 





 chest pain to rule out ACS


Trend cardiac enzymes


 start on aspirin statin


 monitor under  telemetry


 cardiology consulted


 CT is negative for PE


Pain control

















(2) Accelerated hypertension


Current Visit: Yes   Status: Acute   


Plan: 





 continue home medications and titrate as needed


 add on metoprolol


Hydralazine p.r.n


 monitor closely under telemetry











(3) Obesity


Current Visit: Yes   Status: Chronic   


Plan: 


Advised lifestyle modification





Discharge Plan: Home


Plan to discharge in: 24 Hours





- Advance Directives


Does patient have a Living Will: No


Does patient have a Durable POA for Healthcare: No


Time Spent Managing Pts Care (In Minutes): 43
27-Jan-2018 17:56

## 2021-04-23 NOTE — ER
Nurse's Notes                                                                                     

 [facility]                                                                 

Name: Ebony Haro                                                                            

Age: 51 yrs                                                                                       

Sex: Female                                                                                       

: 1970                                                                                   

MRN: [id number]                                                                                   

Arrival Date: 2021                                                                          

Time: 12:37                                                                                       

Account#: [id number]                                                                            

Bed 16                                                                                            

Private MD:                                                                                       

Diagnosis: Chest pain, unspecified                                                                

                                                                                                  

Presentation:                                                                                     

                                                                                             

12:52 Chief complaint: Left sided chest pain x 2 hours. Denies SOB/nausea. Coronavirus        hb  

      screen: At this time, the client does not indicate any symptoms associated with             

      coronavirus-19. Ebola Screen: No symptoms or risks identified at this time. Initial         

      Sepsis Screen: Does the patient meet any 2 criteria? No. Patient's initial sepsis           

      screen is negative. Does the patient have a suspected source of infection? No.              

      Patient's initial sepsis screen is negative. Risk Assessment: Do you want to hurt           

      yourself or someone else? Patient reports no desire to harm self or others. Onset of        

      symptoms was 2021.                                                                

12:52 Method Of Arrival: Ambulatory                                                           hb  

12:52 Acuity: OLIVER 3                                                                           hb  

                                                                                                  

Triage Assessment:                                                                                

14:30 General: Appears in no apparent distress. comfortable, obese, Behavior is cooperative,  bp  

      appropriate for age, anxious. Pain: Complains of pain in chest. EENT: No deficits           

      noted. Neuro: No deficits noted. Cardiovascular: Rhythm is sinus rhythm. Respiratory:       

      No deficits noted. GI: No signs and/or symptoms were reported involving the                 

      gastrointestinal system. : No signs and/or symptoms were reported regarding the           

      genitourinary system. Derm: No deficits noted. Musculoskeletal: No deficits noted.          

                                                                                                  

Historical:                                                                                       

- Allergies:                                                                                      

12:54 No Known Allergies;                                                                     hb  

- Home Meds:                                                                                      

12:54 lisinopril 5 mg Oral tab 1 tab once daily [Active];                                     hb  

- PMHx:                                                                                           

12:54 Hypertension;                                                                           hb  

- PSHx:                                                                                           

12:54 Tubal ligation;                                                                         hb  

                                                                                                  

- Immunization history:: Adult Immunizations unknown.                                             

- Social history:: Smoking status: Patient denies any tobacco usage or history of.                

  Patient/guardian denies using alcohol, street drugs, IV drugs.                                  

                                                                                                  

                                                                                                  

Screening:                                                                                        

15:28 Abuse screen: Denies threats or abuse. Denies injuries from another. Nutritional        bp  

      screening: No deficits noted. Tuberculosis screening: No symptoms or risk factors           

      identified. Fall Risk None identified.                                                      

                                                                                                  

Assessment:                                                                                       

14:30 General: SEE TRIAGE NOTE.                                                               bp  

16:30 Reassessment: No changes from previously documented assessment. Patient and/or family   bp  

      updated on plan of care and expected duration. Pain level reassessed. Patient is alert,     

      oriented x 3, equal unlabored respirations, skin warm/dry/pink.                             

17:30 Reassessment: No changes from previously documented assessment. Patient and/or family   bp  

      updated on plan of care and expected duration. Pain level reassessed. Patient is alert,     

      oriented x 3, equal unlabored respirations, skin warm/dry/pink. PT RETURNED FROM CT.        

18:30 Reassessment: Patient appears in no apparent distress at this time. Patient and/or      bp  

      family updated on plan of care and expected duration. Pain level reassessed. Patient is     

      alert, oriented x 3, equal unlabored respirations, skin warm/dry/pink. ADMIT IN PROCESS.    

19:00 General: Appears in no apparent distress. comfortable, Behavior is calm, cooperative.   sf  

      Pain: Complains of pain in chest Pain radiates to left arm. Neuro: No deficits noted.       

      Cardiovascular: Reports chest pain, Denies diaphoresis, lightheadedness, nausea,            

      shortness of breath, Patient's skin is warm and dry. Rhythm is sinus rhythm.                

      Respiratory: No deficits noted. GI: No deficits noted. : No deficits noted. Derm: No      

      deficits noted. Musculoskeletal: No deficits noted.                                         

20:51 Reassessment: Patient appears in no apparent distress at this time. No changes from     sf  

      previously documented assessment. Patient and/or family updated on plan of care and         

      expected duration. Pain level reassessed. Patient is alert, oriented x 3, equal             

      unlabored respirations, skin warm/dry/pink.                                                 

                                                                                                  

Vital Signs:                                                                                      

12:52  / 106; Pulse 84; Resp 16; Temp 97.8; Pulse Ox 100% ; Pain 7/10;                  hb  

14:30 Pulse 90; Resp 19; Pulse Ox 99% ;                                                       bp  

16:30  / 100; Pulse 95; Resp 17; Pulse Ox 98% ;                                         bp  

17:25  / 103; Pulse 97; Resp 17; Pulse Ox 98% ;                                         bp  

18:30  / 107; Pulse 87; Resp 16; Pulse Ox 96% ;                                         bp  

19:00  / 100; Pulse 84; Resp 16; Pulse Ox 96% ;                                         sf  

19:30  / 82; Pulse 83; Resp 16; Pulse Ox 97% ;                                          sf  

20:00  / 85; Pulse 84; Resp 16; Pulse Ox 96% ;                                          sf  

20:30  / 88; Pulse 83; Resp 16; Pulse Ox 98% ;                                          sf  

                                                                                                  

Vitals:                                                                                           

20:51 Cardiac Rhythm Assessment Sinus rhythm.                                                 sf  

                                                                                                  

ED Course:                                                                                        

12:37 Patient arrived in ED.                                                                  ds1 

12:53 Triage completed.                                                                       hb  

12:54 Arm band placed on.                                                                     hb  

14:29 Christopher Cain PA is PHCP.                                                              [facility] 

14:29 Luis Alberto Leyva MD is Attending Physician.                                             [facility] 

14:30 Filiberto Lopez, RN is Primary Nurse.                                                    bp  

15:14 XRAY Chest (1 view) In Process Unspecified.                                             EDMS

15:20 Inserted saline lock: 20 gauge in left forearm, using aseptic technique. Blood          bp  

      collected.                                                                                  

15:28 Patient has correct armband on for positive identification. Bed in low position. Call   bp  

      light in reach. Side rails up X2. Cardiac monitor on. Pulse ox on. NIBP on.                 

16:29 CT Chest For PE Angio In Process Unspecified.                                           EDMS

17:38 Wilbert Stephens MD is Hospitalizing Provider.                                           [facility] 

19:15 COVID swab sent to lab.                                                                 sf  

20:51 Primary Nurse role handed off by Filiberto Lopez, NANO                                     sf  

20:51 Joshua Rodriguez, RN is Primary Nurse.                                               sf  

20:53 COVID-19 : Document "Date of Symptom Onset" if Symptomatic. Sent.                       sf  

20:53 CORONAVIRUS Sent.                                                                       sf  

23:00 No provider procedures requiring assistance completed. Patient admitted, IV remains in  sf  

      place. Patient maintains SpO2 saturation greater than 95% on room air.                      

                                                                                                  

Administered Medications:                                                                         

17:45 Drug: Aspirin Chewable Tablet 324 mg Route: PO;                                         bp  

19:02 Follow up: Response: No adverse reaction                                                bp  

                                                                                                  

                                                                                                  

Outcome:                                                                                          

17:39 Decision to Hospitalize by Provider.                                                    [facility] 

23:00 Admitted to ER Hold.  Please see [facility] for further documentation.                    sf  

23:00 Condition: stable                                                                           

23:00 Instructed on the need for admit.                                                           

                                                                                             

01:25 Patient left the ED.                                                                    sf  

                                                                                                  

Signatures:                                                                                       

Dispatcher [employer]                           EDMS                                                 

Christopher Cain PA PA   Jill Mcdonald                                ds1                                                  

Fara Cheung RN                     RN                                                      

Filiberto Lopez, NANO                      RN                                                      

Joshua Rodriguez, NANO                 RN   sf                                                   

                                                                                                  

Corrections: (The following items were deleted from the chart)                                    

                                                                                             

17:31 17:25 Pulse 97bpm; Resp 17bpm; Pulse Ox 98%; bp                                         bp  

                                                                                                  

**************************************************************************************************

## 2021-04-23 NOTE — EDPHYS
Physician Documentation                                                                           

 [facility]                                                                 

Name: Ebony Haro                                                                            

Age: 51 yrs                                                                                       

Sex: Female                                                                                       

: 1970                                                                                   

MRN: [id number]                                                                                   

Arrival Date: 2021                                                                          

Time: 12:37                                                                                       

Account#: [id number]                                                                            

Bed 16                                                                                            

Private MD:                                                                                       

ED Physician Luis Alberto Leyva                                                                      

HPI:                                                                                              

                                                                                             

14:59 This 51 yrs old  Female presents to ER via Ambulatory with complaints of Chest  jmm 

      Pain.                                                                                       

14:59 The patient or guardian reports chest pain that is located primarily in the substernal  jmm 

      area. Onset: gradually, 3 hour(s) ago. The pain does not radiate. Associated signs and      

      symptoms: Pertinent negatives: abdominal pain, dizziness, headache, lower extremity         

      pain, lower extremity swelling, shortness of breath. The chest pain is described as         

      aching. Duration: The patient or guardian reports a single episode, that is now             

      resolved. Modifying factors: The symptoms are alleviated by nothing. the symptoms are       

      aggravated by nothing. The patient has not experienced similar symptoms in the past.        

                                                                                                  

Historical:                                                                                       

- Allergies:                                                                                      

12:54 No Known Allergies;                                                                     hb  

- Home Meds:                                                                                      

12:54 lisinopril 5 mg Oral tab 1 tab once daily [Active];                                     hb  

- PMHx:                                                                                           

12:54 Hypertension;                                                                           hb  

- PSHx:                                                                                           

12:54 Tubal ligation;                                                                         hb  

                                                                                                  

- Immunization history:: Adult Immunizations unknown.                                             

- Social history:: Smoking status: Patient denies any tobacco usage or history of.                

  Patient/guardian denies using alcohol, street drugs, IV drugs.                                  

                                                                                                  

                                                                                                  

ROS:                                                                                              

14:59 Constitutional: Negative for fever, chills, and weight loss, Respiratory: Negative for  jmm 

      shortness of breath, cough, wheezing, and pleuritic chest pain.                             

14:59 Cardiovascular: Positive for chest pain.                                                    

14:59 All other systems are negative.                                                             

                                                                                                  

Exam:                                                                                             

14:59 Constitutional:  This is a well developed, well nourished patient who is awake, alert,  jmm 

      and in no acute distress. Head/Face:  atraumatic. Eyes:  EOMI, no conjunctival erythema     

      appreciated ENT:  Moist Mucus Membranes Neck:  Trachea midline, Supple Chest/axilla:        

      Normal chest wall appearance and motion.   Cardiovascular:  Regular rate and rhythm.        

      No edema appreciated Respiratory:  Normal respirations, no respiratory distress             

      appreciated Abdomen/GI:  Non distended, soft Back:  Normal ROM Skin:  General               

      appearance color normal MS/ Extremity:  Moves all extremities, no obvious deformities       

      appreciated, no edema noted to the lower extremities  Neuro:  Awake and alert, normal       

      gait Psych:  Behavior is normal, Mood is normal, Patient is cooperative and pleasant        

                                                                                                  

Vital Signs:                                                                                      

12:52  / 106; Pulse 84; Resp 16; Temp 97.8; Pulse Ox 100% ; Pain 7/10;                  hb  

14:30 Pulse 90; Resp 19; Pulse Ox 99% ;                                                       bp  

16:30  / 100; Pulse 95; Resp 17; Pulse Ox 98% ;                                         bp  

17:25  / 103; Pulse 97; Resp 17; Pulse Ox 98% ;                                         bp  

18:30  / 107; Pulse 87; Resp 16; Pulse Ox 96% ;                                         bp  

19:00  / 100; Pulse 84; Resp 16; Pulse Ox 96% ;                                         sf  

19:30  / 82; Pulse 83; Resp 16; Pulse Ox 97% ;                                          sf  

20:00  / 85; Pulse 84; Resp 16; Pulse Ox 96% ;                                          sf  

20:30  / 88; Pulse 83; Resp 16; Pulse Ox 98% ;                                          sf  

                                                                                                  

MDM:                                                                                              

14:35 Patient medically screened.                                                             lucie 

17:37 The patient was given aspirin in the Emergency Department. Data reviewed: vital signs,  [facility] 

      nurses notes, lab test result(s), EKG, radiologic studies, plain films. ED course: Pain     

      in relieved in the ED. Heart Score = 4. I discussed the patient with Dr. Stephens whom         

      accepted the patient for admission. .                                                       

                                                                                                  

                                                                                             

14:51 Order name: Basic Metabolic Panel; Complete Time: 16:00                                 [facility] 

                                                                                             

14:51 Order name: CBC with Diff; Complete Time: 15:41                                         [facility] 

                                                                                             

14:51 Order name: LFT's; Complete Time: 16:00                                                 [facility] 

                                                                                             

14:51 Order name: Magnesium; Complete Time: 16:00                                             [facility] 

                                                                                             

14:51 Order name: NT PRO-BNP; Complete Time: 16:00                                            [facility] 

                                                                                             

14:51 Order name: PT-INR; Complete Time: 15:55                                                [facility] 

                                                                                             

14:51 Order name: Troponin (emerg Dept Use Only); Complete Time: 16:00                        [facility] 

                                                                                             

14:51 Order name: D-Dimer; Complete Time: 15:55                                               [facility] 

                                                                                             

17:55 Order name: CKMB Creatine Kinase MB                                                     [facility]

                                                                                             

17:55 Order name: Lipid Profile                                                               [facility]

                                                                                             

17:55 Order name: Troponin I                                                                  [facility]

                                                                                             

18:45 Order name: COVID-19 : Document "Date of Symptom Onset" if Symptomatic.                 tt3 

                                                                                             

20:06 Order name: CORONAVIRUS                                                                 [facility]

                                                                                             

20:56 Order name: SARS-COV-2 RT PCR; Complete Time: 20:58                                     [facility]

                                                                                             

12:54 Order name: EKG; Complete Time: 12:55                                                   hb  

                                                                                             

12:54 Order name: EKG - Nurse/Tech; Complete Time: 12:54                                        

                                                                                             

14:51 Order name: XRAY Chest (1 view); Complete Time: 15:24                                   [facility] 

                                                                                             

14:51 Order name: Cardiac monitoring; Complete Time: 15:26                                    [facility] 

                                                                                             

14:51 Order name: IV Saline Lock; Complete Time: 15:26                                        [facility] 

                                                                                             

14:51 Order name: Labs collected and sent; Complete Time: 15:27                               [facility] 

                                                                                             

14:51 Order name: O2 Per Protocol; Complete Time: 15:27                                       [facility] 

                                                                                             

14:51 Order name: O2 Sat Monitoring; Complete Time: 15:27                                     [facility] 

                                                                                             

15:51 Order name: CT Chest For PE Angio; Complete Time: 16:38                                 [facility] 

                                                                                             

17:55 Order name: Heart Healthy                                                               [facility]

                                                                                                  

Administered Medications:                                                                         

17:45 Drug: Aspirin Chewable Tablet 324 mg Route: PO;                                         bp  

19:02 Follow up: Response: No adverse reaction                                                bp  

                                                                                                  

                                                                                                  

Disposition:                                                                                      

21 17:39 Hospitalization ordered by Wilbert Stephens for Observation. Preliminary             

  diagnosis is Chest pain, unspecified.                                                           

- Bed requested for Telemetry/MedSurg (observation).                                              

- Status is Observation.                                                                      sf  

- Condition is Stable.                                                                            

- Problem is new.                                                                                 

- Symptoms have improved.                                                                         

                                                                                                  

                                                                                                  

                                                                                                  

Addendum:                                                                                         

2021                                                                                        

     08:12 Co-signature as Attending Physician, Luis Alberto Leyva MD I agree with the assessment and  c
ha

           plan of care.                                                                          

                                                                                                  

Signatures:                                                                                       

Dispatcher [employer]                           [facility]                                                 

Aline Escobedo RN RN mw Anderson, Corey, MD MD cha Mickail, Joel, PA PA jmm Baxter, Heather, RN RN                                                      

Filiberto Lopez RN RN   bp                                                   

Rodriguez, Joshua, RN                 RN   sf                                                   

                                                                                                  

Corrections: (The following items were deleted from the chart)                                    

                                                                                             

00:33  17:39 Hospitalization Ordered by Wilbert Stephens MD for Observation. Preliminary    mw  

      diagnosis is Chest pain, unspecified. Bed requested for Telemetry/MedSurg                   

      (observation). Status is Observation. Condition is Stable. Problem is new. Symptoms         

      have improved. roc                                                                          

                                                                                             

01:25 00:33 2021 17:39 Hospitalization Ordered by Wilbert Stephens MD for Observation.     sf  

      Preliminary diagnosis is Chest pain, unspecified. Bed requested for Telemetry/MedSurg       

      (observation). Status is Observation. Condition is Stable. Problem is new. Symptoms         

      have improved. mw                                                                           

                                                                                                  

**************************************************************************************************

## 2021-04-24 VITALS — TEMPERATURE: 97.7 F

## 2021-04-24 VITALS — DIASTOLIC BLOOD PRESSURE: 87 MMHG | SYSTOLIC BLOOD PRESSURE: 134 MMHG

## 2021-04-24 LAB
ALBUMIN SERPL BCP-MCNC: 3.6 G/DL (ref 3.4–5)
ALP SERPL-CCNC: 60 U/L (ref 45–117)
ALT SERPL W P-5'-P-CCNC: 22 U/L (ref 12–78)
AST SERPL W P-5'-P-CCNC: 14 U/L (ref 15–37)
BUN BLD-MCNC: 11 MG/DL (ref 7–18)
CKMB CREATINE KINASE MB: 1.4 NG/ML (ref 0.3–3.6)
GLUCOSE SERPLBLD-MCNC: 95 MG/DL (ref 74–106)
HCT VFR BLD CALC: 41 % (ref 36–45)
HDLC SERPL-MCNC: 35 MG/DL (ref 40–60)
LDLC SERPL CALC-MCNC: 121 MG/DL (ref ?–130)
LYMPHOCYTES # SPEC AUTO: 2.6 K/UL (ref 0.7–4.9)
PMV BLD: 7.5 FL (ref 7.6–11.3)
POTASSIUM SERPL-SCNC: 4 MMOL/L (ref 3.5–5.1)
RBC # BLD: 4.71 M/UL (ref 3.86–4.86)
TROPONIN I: < 0.02 NG/ML (ref 0–0.04)

## 2021-04-24 RX ADMIN — Medication SCH: at 09:00

## 2021-04-24 RX ADMIN — METOPROLOL TARTRATE SCH MG: 50 TABLET, FILM COATED ORAL at 09:12

## 2021-04-24 RX ADMIN — ENOXAPARIN SODIUM SCH: 40 INJECTION SUBCUTANEOUS at 09:00

## 2021-04-24 NOTE — EKG
Test Date:    2021-04-23               Test Time:    12:50:01

Technician:   HB                                     

                                                     

MEASUREMENT RESULTS:                                       

Intervals:                                           

Rate:         83                                     

SC:           114                                    

QRSD:         86                                     

QT:           392                                    

QTc:          460                                    

Axis:                                                

P:            13                                     

SC:           114                                    

QRS:          -16                                    

T:            20                                     

                                                     

INTERPRETIVE STATEMENTS:                                       

                                                     

Normal sinus rhythm

Minimal voltage criteria for LVH, may be normal variant

Borderline ECG

Compared to ECG 08/02/2012 10:54:51

No significant changes



Electronically Signed On 04-24-21 07:56:37 CDT by Artemio Connelly

## 2021-04-24 NOTE — CON
Date of Consultation:  04/24/2021



Reason For Consultation:  Chest pain.



History Of Present Illness:  Ms. Haro is a 51-year-old woman with history of hypertension, border
line hypertriglyceridemia.  She came in with a soreness type of chest pain over the left anterior patrice
st and left shoulder that felt like an ache or soreness.  Had some nausea but no vomiting.  No diapho
resis.  No shortness of breath.  Denied any PND, orthopnea, pedal edema, palpitation, or syncope.  De
nied any fever or chills.  She was hypertensive when she came to the emergency room at 178/92.



Past Medical History:  As stated above.



Allergies:  NONE.



Review of Systems:

Negative.



Social History:  Negative for tobacco.



Family History:  Negative for heart disease.



Medications:  At home include lisinopril.



Physical Examination:

Vital Signs:  Blood pressure was 178/92 initially, after metoprolol and went down to 125/61. 

HEENT:  Negative. 

Neck:  Supple with no bruit. 

Chest:  Clear to auscultation and percussion.  

Cardiac:  Revealed a regular rhythm and rate.  No murmurs, gallops, or rubs. 

Abdomen:  Benign. 

Extremities:  Revealed no clubbing, cyanosis, or edema.



Diagnostic Data:  Included a normal EKG, normal chest x-ray, normal CT angiogram.  Her D-dimer was 90
8.  Her white count was 26728.  Troponins were negative.



Impression And Plan:  

1.Atypical chest pain.

2.Hypertension.

3.Obesity.

4.Dyslipidemia. 

I think the patient is to continue on lisinopril.  We need to add beta-blocker to her regimen.  I thi
nk she needs to have an outpatient echocardiogram and a stress test, but I am comfortable with her go
ing home today and I will make arrangements for her outpatient testing in the office as an outpatient
.





NB/ASHLYN

DD:  04/24/2021 08:34:33Voice ID:  [id number]

DT:  04/24/2021 12:12:41Report ID:  [id number]

## 2021-05-10 NOTE — P.DS
Discharge Date: 04/24/21


Disposition: ROUTINE DISCHARGE


Discharge Condition: GOOD


Reason for Admission: chest pain


Brief History of Present Illness: 








Patient is a 51 yrs old  Female with past medical history of 

hypertension on lisinopril came to the ER with left-sided chest pain which has 

been going on for 1 day  duration.


Left-sided location associated with no radiation intermittent,  pressure like  

not  associated with any diaphoresis or nausea vomiting.


Patient denies any shortness of breath, no fever, or chills, no nausea vomiting 

or diarrhea.  Patient denies any sick contact. 





At the time of interview  patient was noted to have accelerated hypertension and

had a initial workup for chest pain was negative so far and is admitted for ACS 

rule out.  Cardiology will be consulted and patient be admitted to be ruled out 

for acute coronary syndrome.





Hospital Course: 





Patient workup was unremarkable.  Troponins were negative.  Blood pressure was 

stable.  At this time, patient is stable for discharge home with outpatient 

follow with Cardiology in 1-2 weeks.


Vital Signs/Physical Exam: 














Temp Pulse Resp BP Pulse Ox


 


 97.7 F   77   16   134/87   97 


 


 04/24/21 08:00  04/24/21 09:12  04/24/21 08:00  04/24/21 09:12  04/24/21 08:00








General: Alert, In no apparent distress, Oriented x3


Laboratory Data at Discharge: 














WBC  9.40 K/uL (4.3-10.9)  D 04/24/21  06:06    


 


Hgb  13.9 g/dL (12.0-15.0)   04/24/21  06:06    


 


Hct  41.0 % (36.0-45.0)   04/24/21  06:06    


 


Plt Count  294 K/uL (152-406)   04/24/21  06:06    


 


PT  11.0 SECONDS (9.5-12.5)   04/23/21  15:20    


 


INR  0.96   04/23/21  15:20    


 


Sodium  138 mmol/L (136-145)   04/24/21  06:06    


 


Potassium  4.0 mmol/L (3.5-5.1)   04/24/21  06:06    


 


BUN  11 mg/dL (7-18)   04/24/21  06:06    


 


Creatinine  0.66 mg/dL (0.55-1.3)   04/24/21  06:06    


 


Glucose  95 mg/dL ()   04/24/21  06:06    


 


Magnesium  2.1 mg/dL (1.8-2.4)   04/23/21  15:20    


 


Total Bilirubin  0.8 mg/dL (0.2-1.0)   04/24/21  06:06    


 


AST  14 U/L (15-37)  L  04/24/21  06:06    


 


ALT  22 U/L (12-78)   04/24/21  06:06    


 


Alkaline Phosphatase  60 U/L ()   04/24/21  06:06    


 


Troponin I  Cancelled   04/24/21  21:00    


 


Triglycerides  150 mg/dL (<150)   04/24/21  06:06    


 


Cholesterol  186 mg/dL (<200)   04/24/21  06:06    


 


HDL Cholesterol  35 mg/dL (40-60)  L  04/24/21  06:06    


 


Cholesterol/HDL Ratio  5.31   04/24/21  06:06    








Home Medications: 








Amlodipine [Norvasc*] 2.5 mg PO DAILY 04/24/21 


Aspirin [Aspirin EC 81 MG] 81 mg PO DAILY 30 Days #30 tablet. 04/24/21 


Atorvastatin Calcium [Lipitor] 40 mg PO BEDTIME 30 Days #30 tab 04/24/21 


Metoprolol Tartrate [Lopressor] 50 mg PO BID 30 Days #60 tablet 04/24/21 





New Medications: 


Aspirin [Aspirin EC 81 MG] 81 mg PO DAILY 30 Days #30 tablet.


Atorvastatin Calcium [Lipitor] 40 mg PO BEDTIME 30 Days #30 tab


Metoprolol Tartrate [Lopressor] 50 mg PO BID 30 Days #60 tablet


Physician Discharge Instructions: 


PROBLEM: Chest Pain





GOAL: Clear understanding of disease process





INSTRUCTIONS:








Diet: heart healthy





Activity: Fall precautions





OK TO DC IV AND DC HOME


FOLLOW-UP WITH PRIMARY CARE PROVIDER IN 1-2 WEEKS


FOLLOW-UP WITH CARDIOLOGY IN 1-2 WEEKS


RETURN TO THE ER IF symptoms worsen


CALL or TEXT DR. ADAMS -[phone] IF ANY QUESTIONS REGARDING HOSPITAL STAY.


PLEASE CALL THE FLOOR -[phone] IF ANY MEDICATION OR NURSING QUESTIONS.





per Dr. Connelly-start metoprolol tartrate 50mg BID. 


Diet: AHA


Activity: Fall precautions


Followup: 


Artemio Connelly MD [ACTIVE - CAN ADMIT] - 1-2 Weeks (Call to make an 

appointment. )


OOT,OOT [Primary Care Provider] - 


Time spent managing pt's care (in minutes): 35

## 2021-09-01 ENCOUNTER — HOSPITAL ENCOUNTER (EMERGENCY)
Dept: HOSPITAL 97 - ER | Age: 51
Discharge: HOME | End: 2021-09-01
Payer: SELF-PAY

## 2021-09-01 VITALS — TEMPERATURE: 98 F | DIASTOLIC BLOOD PRESSURE: 80 MMHG | SYSTOLIC BLOOD PRESSURE: 136 MMHG | OXYGEN SATURATION: 100 %

## 2021-09-01 DIAGNOSIS — R19.7: Primary | ICD-10-CM

## 2021-09-01 DIAGNOSIS — I10: ICD-10-CM

## 2021-09-01 LAB
ALBUMIN SERPL BCP-MCNC: 3.6 G/DL (ref 3.4–5)
ALP SERPL-CCNC: 59 U/L (ref 45–117)
ALT SERPL W P-5'-P-CCNC: 58 U/L (ref 12–78)
AST SERPL W P-5'-P-CCNC: 35 U/L (ref 15–37)
BUN BLD-MCNC: 6 MG/DL (ref 7–18)
GLUCOSE SERPLBLD-MCNC: 126 MG/DL (ref 74–106)
HCT VFR BLD CALC: 41 % (ref 36–45)
INR BLD: 1.04
LYMPHOCYTES # SPEC AUTO: 2 K/UL (ref 0.7–4.9)
MAGNESIUM SERPL-MCNC: 1.9 MG/DL (ref 1.8–2.4)
NT-PROBNP SERPL-MCNC: 113 PG/ML (ref ?–125)
PMV BLD: 7.2 FL (ref 7.6–11.3)
POTASSIUM SERPL-SCNC: 3.3 MMOL/L (ref 3.5–5.1)
RBC # BLD: 4.77 M/UL (ref 3.86–4.86)
TROPONIN (EMERG DEPT USE ONLY): < 0.02 NG/ML (ref 0–0.04)

## 2021-09-01 PROCEDURE — 70450 CT HEAD/BRAIN W/O DYE: CPT

## 2021-09-01 PROCEDURE — 99283 EMERGENCY DEPT VISIT LOW MDM: CPT

## 2021-09-01 PROCEDURE — 80048 BASIC METABOLIC PNL TOTAL CA: CPT

## 2021-09-01 PROCEDURE — 36415 COLL VENOUS BLD VENIPUNCTURE: CPT

## 2021-09-01 PROCEDURE — 83880 ASSAY OF NATRIURETIC PEPTIDE: CPT

## 2021-09-01 PROCEDURE — 96374 THER/PROPH/DIAG INJ IV PUSH: CPT

## 2021-09-01 PROCEDURE — 71045 X-RAY EXAM CHEST 1 VIEW: CPT

## 2021-09-01 PROCEDURE — 84484 ASSAY OF TROPONIN QUANT: CPT

## 2021-09-01 PROCEDURE — 83735 ASSAY OF MAGNESIUM: CPT

## 2021-09-01 PROCEDURE — 85610 PROTHROMBIN TIME: CPT

## 2021-09-01 PROCEDURE — 93005 ELECTROCARDIOGRAM TRACING: CPT

## 2021-09-01 PROCEDURE — 85025 COMPLETE CBC W/AUTO DIFF WBC: CPT

## 2021-09-01 PROCEDURE — 80076 HEPATIC FUNCTION PANEL: CPT

## 2021-09-01 NOTE — RAD REPORT
EXAM DESCRIPTION:  RAD - Chest Single View - 9/1/2021 6:37 pm

 

CLINICAL HISTORY:  weakness, fatigue

 

COMPARISON:  Portable April 23

 

TECHNIQUE:  AP portable chest image was obtained 9/1/2021 6:37 pm .

 

FINDINGS:  Exam is limited by shallow inspiration, under penetrated portable technique and large body
 habitus. The overlying breast soft tissues limit lower lung field evaluation.

 

No large consolidation or mass identified. Small patchy infiltrates are certainly possible. Failure a
nd volume overload are not suspected.

 

 Heart and vasculature are normal. No measurable pleural effusion and no pneumothorax. No acute bony 
abnormality seen. No acute aortic findings suspected.

 

IMPRESSION:  Limited portable study shows no large mass or consolidation.

 

Small patchy infiltrates are certainly possible.

## 2021-09-01 NOTE — RAD REPORT
EXAM DESCRIPTION:  CT - Head Brain Wo Cont - 9/1/2021 6:20 pm

 

CLINICAL HISTORY:  DIZZINESS

 

COMPARISON:  HEAD BRAIN W O CONTRAST dated 11/15/2010

 

TECHNIQUE:  Axial 5 mm thick images of the head were obtained without IV contrast.

 

All CT scans are performed using dose optimization technique as appropriate and may include automated
 exposure control or mA/KV adjustment according to patient size.

 

FINDINGS:  No intracranial hemorrhage, mass, edema or shift of mid-line structures. No acute infarcti
on changes seen. No abnormal extra-axial fluid collections. Ventricles are normal.

 

Mastoid air cells are clear. Mucosal thickening and small air-fluid level seen right maxillary sinus.


 

No acute bony findings.

 

 

IMPRESSION:  No intracranial abnormality identified. No intracranial changes from remote 2010 study.

 

Mucosal thickening and air-fluid level in the right maxillary sinus.

## 2021-09-01 NOTE — EDPHYS
Physician Documentation                                                                           

 [facility]                                                                 

Name: Ebony Haro                                                                            

Age: 51 yrs                                                                                       

Sex: Female                                                                                       

: 1970                                                                                   

MRN: [id number]                                                                                   

Arrival Date: 2021                                                                          

Time: 17:27                                                                                       

Account#: [id number]                                                                            

Bed 4                                                                                             

Private MD:                                                                                       

ED Physician Jacob Gar                                                                         

HPI:                                                                                              

                                                                                             

17:45 This 51 yrs old  Female presents to ER via Ambulatory with complaints of        jmm 

      Dizziness, High Blood Pressure.                                                             

17:45 Onset: The symptoms/episode began/occurred today. Modifying factors: The symptoms are   jmm 

      alleviated by nothing, the symptoms are aggravated by nothing. Associated signs and         

      symptoms: Pertinent negatives: shortness of breath. Is a 51-year-old female with a          

      history of hypertension that presents to the emergency department with complaints of        

      multiple episodes of diarrhea. Patient states she developed dizziness today, denies         

      sensation of the room spinning but had one episode of nausea with it. Denies chest          

      pain, shortness of breath. Patient recently states she recovered from coronavirus.          

                                                                                                  

Historical:                                                                                       

- Allergies:                                                                                      

17:29 No Known Allergies;                                                                     sv  

- PMHx:                                                                                           

17:29 Hypertension;                                                                           sv  

                                                                                                  

- Immunization history:: Adult Immunizations up to date.                                          

- Social history:: Smoking status: Patient denies any tobacco usage or history of.                

                                                                                                  

                                                                                                  

ROS:                                                                                              

17:45 Constitutional: Negative for fever, chills, and weight loss, Cardiovascular: Negative   jmm 

      for chest pain, palpitations, and edema, Respiratory: Negative for shortness of breath,     

      cough, wheezing, and pleuritic chest pain.                                                  

17:45 Abdomen/GI: Positive for vomiting, diarrhea.                                                

17:45 Neuro: Positive for dizziness.                                                              

17:45 All other systems are negative.                                                             

                                                                                                  

Exam:                                                                                             

20:21 Constitutional:  This is a well developed, well nourished patient who is awake, alert,  jmm 

      and in no acute distress. Head/Face:  atraumatic. Eyes:  EOMI, no conjunctival erythema     

      appreciated ENT:  Moist Mucus Membranes Neck:  Trachea midline, Supple Chest/axilla:        

      Normal chest wall appearance and motion.   Cardiovascular:  Regular rate and rhythm.        

      No edema appreciated Respiratory:  Normal respirations, no respiratory distress             

      appreciated Abdomen/GI:  Non distended, soft Back:  Normal ROM Skin:  General               

      appearance color normal MS/ Extremity:  Moves all extremities, no obvious deformities       

      appreciated, no edema noted to the lower extremities  Neuro:  Awake and alert, normal       

      gait Psych:  Behavior is normal, Mood is normal, Patient is cooperative and pleasant        

                                                                                                  

Vital Signs:                                                                                      

17:29  / 96; Pulse 104; Resp 16; Temp 97.8; Pulse Ox 100% ; Weight 127.01 kg; Height 5  sv  

      ft. 2 in. (157.48 cm);                                                                      

19:03  / 97; Pulse 89; Resp 15; Pulse Ox 99% on R/A;                                    hb  

19:59  / 80; Pulse 80; Resp 16; Temp 98; Pulse Ox 100% on R/A;                          ch4 

17:29 Body Mass Index 51.21 (127.01 kg, 157.48 cm)                                            sv  

                                                                                                  

MDM:                                                                                              

17:54 Patient medically screened.                                                             [facility] 

20:21 Data reviewed: vital signs, nurses notes. Counseling: I had a detailed discussion with  vignesh 

      the patient and/or guardian regarding: the historical points, exam findings, and any        

      diagnostic results supporting the discharge/admit diagnosis, lab results, radiology         

      results, the need for outpatient follow up, to return to the emergency department if        

      symptoms worsen or persist or if there are any questions or concerns that arise at          

      home. ED course: Is alert and nontoxic in appearance in the ED. Patient states feeling      

      much better after IV fluids. Patient may be experiencing residual effects from              

      coronavirus. I do not currently suspect an arrhythmia, signs of hypertension, bacterial     

      infection, CVA, AMI, or any other life-threatening conditions. Patient is advised to        

      follow PCP and otherwise given strict return precautions. Patient understood and agrees     

      plan of care..                                                                              

                                                                                                  

                                                                                             

17:54 Order name: Basic Metabolic Panel                                                       [facility] 

                                                                                             

17:54 Order name: CBC with Diff                                                               [facility] 

                                                                                             

17:54 Order name: LFT's; Complete Time: 18:43                                                 [facility] 

                                                                                             

17:54 Order name: Magnesium; Complete Time: 18:43                                             [facility] 

                                                                                             

17:54 Order name: NT PRO-BNP; Complete Time: 18:43                                            [facility] 

                                                                                             

17:54 Order name: PT-INR; Complete Time: 18:52                                                [facility] 

                                                                                             

17:54 Order name: Troponin (emerg Dept Use Only); Complete Time: 18:43                        [facility] 

                                                                                             

17:54 Order name: XRAY Chest (1 view); Complete Time: 19:23                                   [facility] 

                                                                                             

17:54 Order name: EKG; Complete Time: 17:56                                                   [facility] 

                                                                                             

17:54 Order name: CT Head Brain wo Cont; Complete Time: 18:52                                 [facility] 

                                                                                             

17:55 Order name: Basic Metabolic Panel; Complete Time: 18:43                                 [facility]

                                                                                             

17:55 Order name: CBC with Automated Diff; Complete Time: 18:43                               [facility]

                                                                                             

17:54 Order name: Cardiac monitoring; Complete Time: 18:08                                    [facility] 

                                                                                             

17:54 Order name: EKG - Nurse/Tech; Complete Time: 18:08                                      [facility] 

                                                                                             

17:54 Order name: IV Saline Lock; Complete Time: 18:08                                        [facility] 

                                                                                             

17:54 Order name: Labs collected and sent; Complete Time: 18:08                               [facility] 

                                                                                             

17:54 Order name: O2 Per Protocol; Complete Time: 18:08                                       [facility] 

                                                                                             

17:54 Order name: O2 Sat Monitoring; Complete Time: 18:08                                     [facility] 

                                                                                                  

Administered Medications:                                                                         

18:07 Drug: NS 0.9% 1000 ml Route: IV; Rate: 1 bolus; Site: left forearm;                     jl7 

18:07 Drug: Zofran (Ondansetron) 4 mg Route: IVP; Site: left forearm;                         jl7 

                                                                                                  

                                                                                                  

Disposition Summary:                                                                              

21 20:23                                                                                    

Discharge Ordered                                                                                 

      Location: Home                                                                          [facility] 

      Condition: Stable                                                                       [facility] 

      Diagnosis                                                                                   

        - Diarrhea, unspecified                                                               [facility] 

      Followup:                                                                               [facility] 

        - With: Private Physician                                                                  

        - When: 2 - 3 days                                                                         

        - Reason: Recheck today's complaints, Continuance of care, Re-evaluation by your           

      physician                                                                                   

      Discharge Instructions:                                                                     

        - Discharge Summary Sheet                                                             [facility] 

        - Food Choices to Help Relieve Diarrhea, Adult                                        [facility] 

        - Diarrhea, Adult                                                                     [facility] 

      Forms:                                                                                      

        - Medication Reconciliation Form                                                      [facility] 

        - Thank You Letter                                                                    [facility] 

        - Antibiotic Education                                                                [facility] 

        - Prescription Opioid Use                                                             [facility] 

Addendum:                                                                                         

2021                                                                                        

     19:01 Co-signature as Attending Physician, Jacob Gar MD.                                    r
n

                                                                                                  

Signatures:                                                                                       

Dispatcher [employer]                           Ruthann Marquez, RN                    Christopher Schrader PA PA   [facility]                                                  

Jacob Gar MD MD rn Leal, Jahala, RN RN   jl7                                                  

                                                                                                  

Corrections: (The following items were deleted from the chart)                                    

                                                                                             

20:21 17:45 Is a 51-year-old female with a history of hypertension that presents to the       [facility] 

      emergency department with complaints of multiple episodes of diarrhea and one episode       

      of vomiting. Patient states she developed dizziness today, denies sensation of the room     

      spinning but had one episode of vomiting with. Denies chest pain, shortness of breath.      

      Patient recently states she recovered from coronavirus. vignesh                                 

                                                                                                  

**************************************************************************************************

## 2021-09-01 NOTE — ER
Nurse's Notes                                                                                     

 [facility]                                                                 

Name: Ebony Haro                                                                            

Age: 51 yrs                                                                                       

Sex: Female                                                                                       

: 1970                                                                                   

MRN: [id number]                                                                                   

Arrival Date: 2021                                                                          

Time: 17:27                                                                                       

Account#: [id number]                                                                            

Bed 4                                                                                             

Private MD:                                                                                       

Diagnosis: Diarrhea, unspecified                                                                  

                                                                                                  

Presentation:                                                                                     

                                                                                             

17:28 Chief complaint: Patient states: dizziness started yesterday. Coronavirus screen:       sv  

      Vaccine status: Patient reports being unvaccinated. At this time, the client does not       

      indicate any symptoms associated with coronavirus-19. Ebola Screen: No symptoms or          

      risks identified at this time. Risk Assessment: Do you want to hurt yourself or someone     

      else? Patient reports no desire to harm self or others. Onset of symptoms was 2021.                                                                                   

17:28 Method Of Arrival: Ambulatory                                                           sv  

17:28 Acuity: OLIVER 3                                                                           sv  

17:29 Initial Sepsis Screen: Does the patient meet any 2 criteria? HR > 90 bpm. No. Patient's sv  

      initial sepsis screen is negative. Does the patient have a suspected source of              

      infection? No. Patient's initial sepsis screen is negative.                                 

                                                                                                  

Triage Assessment:                                                                                

17:30 General: Appears in no apparent distress. comfortable, Behavior is calm, cooperative,   sv  

      appropriate for age. Pain: Denies pain. Neuro: Level of Consciousness is awake, alert,      

      obeys commands, Gait is steady, Reports dizziness. Respiratory: Respiratory effort is       

      even, unlabored.                                                                            

                                                                                                  

Historical:                                                                                       

- Allergies:                                                                                      

17:29 No Known Allergies;                                                                     sv  

- PMHx:                                                                                           

17:29 Hypertension;                                                                           sv  

                                                                                                  

- Immunization history:: Adult Immunizations up to date.                                          

- Social history:: Smoking status: Patient denies any tobacco usage or history of.                

                                                                                                  

                                                                                                  

Screenin:57 Abuse screen: Denies threats or abuse. Denies injuries from another. Nutritional        hb  

      screening: No deficits noted. Tuberculosis screening: No symptoms or risk factors           

      identified. Fall Risk None identified.                                                      

                                                                                                  

Assessment:                                                                                       

18:00 General: Appears in no apparent distress. Behavior is calm, cooperative. Pain: Denies   hb  

      pain. Neuro: Level of Consciousness is awake, alert, obeys commands, Oriented to            

      person, place, time, situation, Reports dizziness. Cardiovascular: Patient's skin is        

      warm and dry. Respiratory: Respiratory effort is even, unlabored, Respiratory pattern       

      is regular, symmetrical. GI: Reports nausea. : No signs and/or symptoms were reported     

      regarding the genitourinary system. EENT: No signs and/or symptoms were reported            

      regarding the EENT system. Derm: Skin is pink, warm \T\ dry. Musculoskeletal: No signs      

      and/or symptoms reported regarding the musculoskeletal system.                              

18:57 Reassessment: Patient appears in no apparent distress at this time.                     hb  

                                                                                                  

Vital Signs:                                                                                      

17:29  / 96; Pulse 104; Resp 16; Temp 97.8; Pulse Ox 100% ; Weight 127.01 kg; Height 5  sv  

      ft. 2 in. (157.48 cm);                                                                      

19:03  / 97; Pulse 89; Resp 15; Pulse Ox 99% on R/A;                                    hb  

19:59  / 80; Pulse 80; Resp 16; Temp 98; Pulse Ox 100% on R/A;                          ch4 

17:29 Body Mass Index 51.21 (127.01 kg, 157.48 cm)                                            sv  

                                                                                                  

ED Course:                                                                                        

17:27 Patient arrived in ED.                                                                  ds1 

17:29 Triage completed.                                                                       sv  

17:29 Arm band placed on.                                                                     sv  

17:38 Christopher Cain PA is PHCP.                                                              jmm 

17:38 Jacob Gar MD is Attending Physician.                                                jmm 

17:41 EKG done, by ED staff, reviewed by Christopher LANGLEY.                                     mt  

17:57 Kwesi Chino, RN is Primary Nurse.                                                      jl7 

18:20 CT Head Brain wo Cont In Process Unspecified.                                           EDMS

18:37 XRAY Chest (1 view) In Process Unspecified.                                             EDMS

18:57 Patient has correct armband on for positive identification. Bed in low position. Call   hb  

      light in reach.                                                                             

19:24 Primary Nurse role handed off by Kwesi Chino, NANO                                         

19:28 Sabra Riggins, RN is Primary Nurse.                                                 ch4 

20:42 IV discontinued, intact, bleeding controlled, No redness/swelling at site. Pressure     ch4 

      dressing applied.                                                                           

                                                                                                  

Administered Medications:                                                                         

18:07 Drug: NS 0.9% 1000 ml Route: IV; Rate: 1 bolus; Site: left forearm;                     jl7 

18:07 Drug: Zofran (Ondansetron) 4 mg Route: IVP; Site: left forearm;                         jl7 

                                                                                                  

                                                                                                  

Outcome:                                                                                          

20:23 Discharge ordered by MD.                                                                jmm 

20:42 Patient left the ED.                                                                    ch4 

                                                                                                  

Signatures:                                                                                       

Dispatcher [employer]                           EDMS                                                 

Piper Adan                              bd                                                   

Darwin, Ruthann, RN                    RN                                                      

Christopher Cain PA PA jmm Sanford, Jill                                ds1                                                  

Fara Cheung, NANO                     RN                                                      

Kwesi Chino RN RN   jl7                                                  

Kathy Heaton mt, Christina, RN RN   ch4                                                  

                                                                                                  

Corrections: (The following items were deleted from the chart)                                    

17: 17:28 Acuity: OLIVER 4 sv                                                                  sv  

17:31 17:29 Pulse 104bpm; Resp 16bpm; Pulse Ox 100%; Temp 97.8F; 127.01 kg; Height 5 ft. 2    sv  

      in.; BMI: 51.2; sv                                                                          

                                                                                                  

**************************************************************************************************

## 2021-09-01 NOTE — XMS REPORT
Continuity of Care Document

                          Created on:2021



Patient:MATEUS PATTERSON

Sex:Female

:1970

External Reference #:[id number]





Demographics







                          Address                   [address]



                                                    [address] [place] [address]



                                                    [place] [address]

 

                          Home Phone                [phone]

 

                          Work Phone                [phone]

 

                          Email Address             [email or web address]

 

                          Preferred Language        Unknown

 

                          Marital Status            Unknown

 

                          Taoist Affiliation     Unknown

 

                          Race                      Unknown

 

                          Additional Race(s)        Unavailable

 

                          Ethnic Group              Unknown









Author







                          Organization              [facility]

t

 

                          Address                   [address] [place] [address] [address]



                                                    [place] [address]

 

                          Phone                     [phone]









Care Team Providers







                    Name                Role                Phone

 

                    Unavailable         Unavailable         Unavailable









Problems

This patient has no known problems.



Allergies, Adverse Reactions, Alerts

This patient has no known allergies or adverse reactions.



Medications

This patient has no known medications.



Procedures

This patient has no known procedures.



Results

This patient has no known results.

## 2021-12-05 ENCOUNTER — HOSPITAL ENCOUNTER (EMERGENCY)
Dept: HOSPITAL 97 - ER | Age: 51
Discharge: HOME | End: 2021-12-05
Payer: SELF-PAY

## 2021-12-05 VITALS — DIASTOLIC BLOOD PRESSURE: 85 MMHG | OXYGEN SATURATION: 100 % | SYSTOLIC BLOOD PRESSURE: 154 MMHG

## 2021-12-05 VITALS — TEMPERATURE: 97.1 F

## 2021-12-05 DIAGNOSIS — I10: ICD-10-CM

## 2021-12-05 DIAGNOSIS — J06.9: Primary | ICD-10-CM

## 2021-12-05 DIAGNOSIS — Z20.822: ICD-10-CM

## 2021-12-05 LAB — SARS-COV-2 RNA RESP QL NAA+PROBE: NEGATIVE

## 2021-12-05 PROCEDURE — 87081 CULTURE SCREEN ONLY: CPT

## 2021-12-05 PROCEDURE — 87070 CULTURE OTHR SPECIMN AEROBIC: CPT

## 2021-12-05 PROCEDURE — 71045 X-RAY EXAM CHEST 1 VIEW: CPT

## 2021-12-05 PROCEDURE — 99283 EMERGENCY DEPT VISIT LOW MDM: CPT

## 2021-12-05 PROCEDURE — 0240U: CPT

## 2021-12-05 NOTE — ER
Nurse's Notes                                                                                     

 [facility]                                                                 

Name: Ebony Haro                                                                            

Age: 51 yrs                                                                                       

Sex: Female                                                                                       

: 1970                                                                                   

MRN: [id number]                                                                                   

Arrival Date: 2021                                                                          

Time: 14:49                                                                                       

Account#: [id number]                                                                            

Bed 13                                                                                            

Private MD:                                                                                       

Diagnosis: Acute upper respiratory infection, unspecified                                         

                                                                                                  

Presentation:                                                                                     

                                                                                             

15:41 Chief complaint: Patient states: Cough, wheezing, nasal discharge and sore throat x3    vg1 

      days. Denies NVD, shortness of breath or difficulty breathing. Coronavirus screen:          

      Vaccine status: Patient reports being unvaccinated. Client denies travel out of the         

      [place] in the last 14 days. Ebola Screen: Patient negative for fever greater than or          

      equal to 101.5 degrees Fahrenheit, and additional compatible Ebola Virus Disease            

      symptoms. Initial Sepsis Screen: Does the patient meet any 2 criteria? No. Patient's        

      initial sepsis screen is negative. Does the patient have a suspected source of              

      infection? No. Patient's initial sepsis screen is negative. Risk Assessment: Do you         

      want to hurt yourself or someone else? Patient reports no desire to harm self or            

      others. Onset of symptoms was 2021.                                             

15:41 Method Of Arrival: Ambulatory                                                           vg1 

15:41 Acuity: OLIVER 3                                                                           vg1 

                                                                                                  

Triage Assessment:                                                                                

15:43 General: Appears in no apparent distress. comfortable, Behavior is calm, cooperative.   vg1 

      Pain: Denies pain. Respiratory: Reports cough that is productive, Airway is patent          

      Respiratory effort is even, unlabored. GI: Patient currently denies diarrhea, nausea,       

      vomiting.                                                                                   

                                                                                                  

OB/GYN:                                                                                           

15:43 LMP N/A - Irregular menses                                                              vg1 

                                                                                                  

Historical:                                                                                       

- Allergies:                                                                                      

15:43 No Known Allergies;                                                                     vg1 

- Home Meds:                                                                                      

15:43 lisinopril 5 mg Oral tab 1 tab once daily [Active];                                     vg1 

- PMHx:                                                                                           

15:43 Hypertension;                                                                           vg1 

- PSHx:                                                                                           

15:43 None;                                                                                   vg1 

                                                                                                  

- Immunization history:: Client reports having NOT received the Covid vaccine.                    

- Social history:: Smoking status: Patient denies any tobacco usage or history of.                

- Family history:: not pertinent.                                                                 

- Hospitalizations: : No recent hospitalization is reported.                                      

                                                                                                  

                                                                                                  

Screenin:35 Abuse screen: Denies threats or abuse.                                                  [facility] 

16:35 Nutritional screening: No deficits noted. Tuberculosis screening: No symptoms or risk   [facility] 

      factors identified. Fall Risk None identified.                                              

                                                                                                  

Assessment:                                                                                       

16:35 General: Appears in no apparent distress. Behavior is calm, cooperative.                [facility] 

16:35 Pain: Denies pain. EENT: Reports nasal congestion nasal discharge that is watery.       [facility] 

                                                                                                  

Vital Signs:                                                                                      

15:41  / 91; Pulse 86; Resp 18; Temp 97.1; Pulse Ox 99% ; Weight 122.47 kg; Height 5    vg1 

      ft. 2 in. (157.48 cm); Pain 0/10;                                                           

16:45  / 85; Pulse 84; Resp 17; Pulse Ox 100% ;                                         jh6 

15:41 Body Mass Index 49.38 (122.47 kg, 157.48 cm)                                            1 

                                                                                                  

ED Course:                                                                                        

14:49 Patient arrived in ED.                                                                  ds1 

15:43 Triage completed.                                                                       1 

15:43 Arm band placed on.                                                                     1 

15:46 Jacob Gar MD is Attending Physician.                                                rn  

15:51 Tegan Angel RN is Primary Nurse.                                                 [facility] 

16:31 Patient has correct armband on for positive identification. Bed in low position. Warm   [facility] 

      blanket given. Pulse ox on. NIBP on.                                                        

16:31 SARS-COV-2 RT PCR (Document "Date of Onset" if Symptomatic) Sent.                       5 

16:31 COVID swab sent to lab. Flu and/or RSV swab sent to lab.                                [facility] 

16:35 No provider procedures requiring assistance completed.                                  6 

16:52 XRAY Chest (1 view) In Process Unspecified.                                             EDMS

17:39 Patient did not have IV access during this emergency room visit.                        [facility] 

                                                                                                  

Administered Medications:                                                                         

No medications were administered                                                                  

                                                                                                  

                                                                                                  

Outcome:                                                                                          

17:31 Discharge ordered by MD.                                                                rn  

17:39 Discharged to home ambulatory.                                                          [facility] 

17:39 Condition: good                                                                             

17:39 Discharge instructions given to patient, Instructed on discharge instructions,              

      Demonstrated understanding of instructions.                                                 

17:40 Patient left the ED.                                                                    [facility] 

                                                                                                  

Signatures:                                                                                       

Dispatcher [facility]                                                 

Martinez, Jill                                1                                                  

Jacob Gar MD MD rn Martinez, Maria                              [facility]                                                  

Deanna Botello RN                    RN   [facility]                                                  

Tegan Angel RN                   RN   [facility]                                                  

                                                                                                  

Corrections: (The following items were deleted from the chart)                                    

15:45 15:41 Pulse 86bpm; Resp 18bpm; Pulse Ox 99%; Temp 97.1F; 122.47 kg; Height 5 ft. 2 in.; vg1 

      BMI: 49.3; Pain 0/10; vg1                                                                   

16:31 16:31 SARS-COV-2 RT PCR drawn and sent. [facility]                                             EDMS

                                                                                                  

**************************************************************************************************

## 2021-12-05 NOTE — XMS REPORT
Continuity of Care Document

                           Created on:2021



Patient:MATEUS PATTERSON

Sex:Female

:1970

External Reference #:[id number]





Demographics







                          Address                   [address]



                                                    [address] [place] [address]



                                                    [place] [address]

 

                          Home Phone                [phone]

 

                          Email Address             [email or web address]

 

                          Preferred Language        Unknown

 

                          Marital Status            Unknown

 

                          Episcopal Affiliation     Unknown

 

                          Race                      Unknown

 

                          Ethnic Group              Unknown









Author







                          Organization              [facility]

t

 

                          Address                   [address] [place] [address] [address]



                                                    [place] [address]

 

                          Phone                     [phone]









Care Team Providers







                    Name                Role                Phone

 

                    FELICITY VICTOR          Attending Clinician Unavailable









Problems

This patient has no known problems.



Allergies, Adverse Reactions, Alerts







       Allergy Allergy Status Severity Reaction(s) Onset  Inactive Treating Comm

ents 

Source



       Name   Type                        Date   Date   Clinician        

 

       NO KNOWN Drug   Active                                           [facility]



       ALLERGIE Class                                                   ity of



       S                                                              [facility]







Medications

This patient has no known medications.



Procedures

This patient has no known procedures.



Encounters







        Start   End     Encounter Admission Attending Care    Care    Encounter 

Source



        Date/Time Date/Time Type    Type    Clinicians Facility Department ID   

   

 

        2021 Outpatient CONRAD DOMINGUEZ    [facility]    [id number]

[facility]



        14:45:00 14:45:00                 YARA jernigan

[facility]







Results

This patient has no known results.

## 2021-12-05 NOTE — RAD REPORT
EXAM DESCRIPTION:  RAD - Chest Single View - 12/5/2021 4:52 pm

 

CLINICAL HISTORY:  CHEST PAIN

 

COMPARISON:  September 1

 

TECHNIQUE:  AP portable chest image was obtained 12/5/2021 4:52 pm .

 

FINDINGS:  Lungs are clear. Heart and vasculature are normal. No measurable pleural effusion and no p
neumothorax. No acute bony abnormality seen. No acute aortic findings suspected.

 

IMPRESSION:  No acute cardiopulmonary process.

 

No significant change from comparison study.

## 2021-12-05 NOTE — EDPHYS
Physician Documentation                                                                           

 [facility]                                                                 

Name: Ebony Haro                                                                            

Age: 51 yrs                                                                                       

Sex: Female                                                                                       

: 1970                                                                                   

MRN: [id number]                                                                                   

Arrival Date: 2021                                                                          

Time: 14:49                                                                                       

Account#: [id number]                                                                            

Bed 13                                                                                            

Private MD:                                                                                       

ED Physician Jacob Gar                                                                         

HPI:                                                                                              

                                                                                             

16:51 This 51 yrs old  Female presents to ER via Ambulatory with complaints of Cough, rn  

      congestion.                                                                                 

16:51 The patient or guardian reports cough. Onset: The symptoms/episode began/occurred 3     rn  

      day(s) ago. Severity of symptoms: At their worst the symptoms were mild, in the             

      emergency department the symptoms are unchanged. Modifying factors: The symptoms are        

      alleviated by nothing, the symptoms are aggravated by nothing. Associated signs and         

      symptoms: Pertinent positives: rhinorrhea, sore throat, Pertinent negatives: chest          

      pain, diarrhea, fever, vomiting. The patient has experienced similar episodes in the        

      past. The patient has not recently seen a physician. Patient reports cough/congestion       

      for 3 days now. Reports daughter with similar symptoms then patient began with symptoms     

      the next day. No fever.                                                                     

                                                                                                  

OB/GYN:                                                                                           

15:43 LMP N/A - Irregular menses                                                              vg1 

                                                                                                  

Historical:                                                                                       

- Allergies:                                                                                      

15:43 No Known Allergies;                                                                     vg1 

- Home Meds:                                                                                      

15:43 lisinopril 5 mg Oral tab 1 tab once daily [Active];                                     vg1 

- PMHx:                                                                                           

15:43 Hypertension;                                                                           vg1 

- PSHx:                                                                                           

15:43 None;                                                                                   vg1 

                                                                                                  

- Immunization history:: Client reports having NOT received the Covid vaccine.                    

- Social history:: Smoking status: Patient denies any tobacco usage or history of.                

- Family history:: not pertinent.                                                                 

- Hospitalizations: : No recent hospitalization is reported.                                      

                                                                                                  

                                                                                                  

ROS:                                                                                              

16:51 Constitutional: Negative for fever, chills, and weight loss, Eyes: Negative for injury, rn  

      pain, redness, and discharge, ENT: + congestion and sore throat, raspy voice                

      Cardiovascular: Negative for chest pain, palpitations, and edema, Respiratory: + cough      

      Abdomen/GI: Negative for abdominal pain, nausea, vomiting, diarrhea, and constipation,      

      Back: Negative for injury and pain, MS/Extremity: Negative for injury and deformity,        

      Skin: Negative for injury, rash, and discoloration, Neuro: Negative for headache,           

      weakness, numbness, tingling, and seizure.                                                  

                                                                                                  

Exam:                                                                                             

16:51 Constitutional:  This is a well developed, well nourished patient who is awake, alert,  rn  

      and in no acute distress. Head/Face:  Normocephalic, atraumatic. Eyes:  Periorbital         

      areas with no swelling, redness, or edema. Cardiovascular:  Regular rate and rhythm.        

      No pulse deficits. Respiratory:  No increased work of breathing, no retractions or          

      nasal flaring. Skin:  Warm, dry MS/ Extremity:  Pulses equal, no cyanosis. Neuro:           

      Awake and alert, GCS 15                                                                     

                                                                                                  

Vital Signs:                                                                                      

15:41  / 91; Pulse 86; Resp 18; Temp 97.1; Pulse Ox 99% ; Weight 122.47 kg; Height 5    vg1 

      ft. 2 in. (157.48 cm); Pain 0/10;                                                           

16:45  / 85; Pulse 84; Resp 17; Pulse Ox 100% ;                                         jh6 

15:41 Body Mass Index 49.38 (122.47 kg, 157.48 cm)                                            vg1 

                                                                                                  

MDM:                                                                                              

15:46 Patient medically screened.                                                             rn  

17:28 Differential Diagnosis: Bronchitis Influenza Upper Respiratory Infection Viral Syndrome rn  

      Pneumonia. Data reviewed: vital signs, nurses notes, lab test result(s), radiologic         

      studies, and as a result, I will discharge patient. Test interpretation: by ED              

      physician or midlevel provider: plain radiologic studies, Chest x-ray negative for          

      pneumonia. Counseling: I had a detailed discussion with the patient and/or guardian         

      regarding: the historical points, exam findings, and any diagnostic results supporting      

      the discharge/admit diagnosis, lab results, radiology results, the need for outpatient      

      follow up, to return to the emergency department if symptoms worsen or persist or if        

      there are any questions or concerns that arise at home. Response to treatment:. Special     

      discussion: I discussed with the patient/guardian in detail that at this point there is     

      no indication for admission to the hospital. It is understood, however, that if the         

      symptoms persist or worsen the patient needs to return immediately for re-evaluation.       

17:28 ED course: Most likely viral syndrome given daughter preceded her symptoms. Afebrile.   rn  

      No oxygen requirement. Negative pneumonia on chest x-ray..                                  

                                                                                                  

                                                                                             

15:58 Order name: SARS-COV-2 RT PCR (Document "Date of Onset" if Symptomatic)                 rn  

                                                                                             

15:58 Order name: Strep; Complete Time: 17:05                                                 rn  

                                                                                             

16:31 Order name: Throat Culture                                                              EDMS

                                                                                             

16:31 Order name: COVID-19/FLU A+B; Complete Time: 17:05                                      EDMS

                                                                                             

15:58 Order name: XRAY Chest (1 view)                                                         rn  

                                                                                                  

Administered Medications:                                                                         

No medications were administered                                                                  

                                                                                                  

                                                                                                  

Disposition Summary:                                                                              

21 17:31                                                                                    

Discharge Ordered                                                                                 

      Location: Home                                                                          rn  

      Problem: new                                                                            rn  

      Symptoms: have improved                                                                 rn  

      Condition: Stable                                                                       rn  

      Diagnosis                                                                                   

        - Acute upper respiratory infection, unspecified                                      rn  

      Followup:                                                                               rn  

        - With: Private Physician                                                                  

        - When: As needed                                                                          

        - Reason: Recheck today's complaints, Re-evaluation by your physician                      

      Discharge Instructions:                                                                     

        - Discharge Summary Sheet                                                             rn  

        - Upper Respiratory Infection, Adult                                                  rn  

        - Viral Respiratory Infection                                                         rn  

      Forms:                                                                                      

        - Medication Reconciliation Form                                                      rn  

        - Thank You Letter                                                                    rn  

        - Antibiotic Education                                                                rn  

        - Prescription Opioid Use                                                             rn  

        - Work release form                                                                   eb  

Signatures:                                                                                       

Dispatcher [employer]                           EDMS                                                 

Jacob Gar MD MD   rn                                                   

Ayan, Deanna RN                    RN   vg1                                                  

                                                                                                  

Corrections: (The following items were deleted from the chart)                                    

16:31 15:58 SARS-COV-2 RT PCR ordered. [facility]                                                   EDMS

16:31 15:58 Influenza Screen (A \T\ B)+BA.LAB.BRZ ordered. [facility]                                 EDMS

                                                                                                  

**************************************************************************************************

## 2022-06-29 ENCOUNTER — HOSPITAL ENCOUNTER (EMERGENCY)
Dept: HOSPITAL 97 - ER | Age: 52
Discharge: HOME | End: 2022-06-29
Payer: SELF-PAY

## 2022-06-29 VITALS — OXYGEN SATURATION: 98 % | SYSTOLIC BLOOD PRESSURE: 142 MMHG | TEMPERATURE: 97.8 F | DIASTOLIC BLOOD PRESSURE: 62 MMHG

## 2022-06-29 DIAGNOSIS — M54.31: ICD-10-CM

## 2022-06-29 DIAGNOSIS — S39.092A: Primary | ICD-10-CM

## 2022-06-29 DIAGNOSIS — N83.292: ICD-10-CM
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
## 2022-06-29 DIAGNOSIS — E66.9: ICD-10-CM

## 2022-06-29 DIAGNOSIS — K80.80: ICD-10-CM

## 2022-06-29 DIAGNOSIS — I10: ICD-10-CM

## 2022-06-29 PROCEDURE — 72131 CT LUMBAR SPINE W/O DYE: CPT

## 2022-06-29 PROCEDURE — 72170 X-RAY EXAM OF PELVIS: CPT

## 2022-06-29 NOTE — EDPHYS
Physician Documentation                                                                           

 [facility]                                                                 

Name: Ebony Haro                                                                            

Age: 52 yrs                                                                                       

Sex: Female                                                                                       

: 1970                                                                                   

MRN: [id number]                                                                                   

Arrival Date: 2022                                                                          

Time: 09:22                                                                                       

Account#: [id number]                                                                            

Bed 16                                                                                            

Private MD:                                                                                       

ED Physician Luis Alberto Leyva                                                                      

HPI:                                                                                              

                                                                                             

10:30 This 52 yrs old  Female presents to ER via Ambulatory with complaints of Back   lucie 

      Pain.                                                                                       

10:30 The patient presents with pain that is acute, and decreased range of motion, and an     lucie 

      injury, and tenderness. The symptoms are located in the low back, lumbar area and right     

      low back. Onset: The symptoms/episode began/occurred 5 day(s) ago. The pain does not        

      radiate. Associated signs and symptoms: The patient has no apparent associated signs or     

      symptoms. The problem was sustained during a fall, while standing, in bathtub. Severity     

      of symptoms: At their worst the symptoms were moderate, in the emergency department the     

      symptoms are unchanged. The patient has not experienced similar symptoms in the past.       

                                                                                                  

OB/GYN:                                                                                           

09:37 LMP 2022                                                                           ap3 

                                                                                                  

Historical:                                                                                       

- Allergies:                                                                                      

09:35 No Known Allergies;                                                                     ap3 

- Home Meds:                                                                                      

09:35 lisinopril 5 mg Oral tab 1 tab once daily [Active];                                     ap3 

- PMHx:                                                                                           

09:35 Hypertension;                                                                           ap3 

                                                                                                  

- Immunization history:: Client reports having NOT received the Covid vaccine.                    

- Social history:: Smoking status: Patient denies any tobacco usage or history of.                

- Family history:: not pertinent.                                                                 

                                                                                                  

                                                                                                  

ROS:                                                                                              

10:30 Constitutional: Negative for fever, chills, and weight loss, Eyes: Negative for injury, lucie 

      pain, redness, and discharge, ENT: Negative for injury, pain, and discharge, Neck:          

      Negative for injury, pain, and swelling, Cardiovascular: Negative for chest pain,           

      palpitations, and edema, Respiratory: Negative for shortness of breath, cough,              

      wheezing, and pleuritic chest pain, Abdomen/GI: Negative for abdominal pain, nausea,        

      vomiting, diarrhea, and constipation, : Negative for injury, bleeding, discharge, and     

      swelling, MS/Extremity: Negative for injury and deformity, Skin: Negative for injury,       

      rash, and discoloration, Neuro: Negative for headache, weakness, numbness, tingling,        

      and seizure, Psych: Negative for depression, anxiety, suicide ideation, homicidal           

      ideation, and hallucinations, Allergy/Immunology: Negative for hives, rash, and             

      allergies, Endocrine: Negative for neck swelling, polydipsia, polyuria, polyphagia, and     

      marked weight changes, Hematologic/Lymphatic: Negative for swollen nodes, abnormal          

      bleeding, and unusual bruising.                                                             

10:30 Back: Positive for decreased range of motion, pain at rest, pain with movement,             

      radiated pain, of the right low back.                                                       

                                                                                                  

Exam:                                                                                             

10:30 Constitutional:  This is a well developed, well nourished patient who is awake, alert,  lucie 

      and in no acute distress. Head/Face:  Normocephalic, atraumatic. Eyes:  Pupils equal        

      round and reactive to light, extra-ocular motions intact.  Lids and lashes normal.          

      Conjunctiva and sclera are non-icteric and not injected.  Cornea within normal limits.      

      Periorbital areas with no swelling, redness, or edema. ENT:  Nares patent. No nasal         

      discharge, no septal abnormalities noted.  Tympanic membranes are normal and external       

      auditory canals are clear.  Oropharynx with no redness, swelling, or masses, exudates,      

      or evidence of obstruction, uvula midline.  Mucous membranes moist. Neck:  Trachea          

      midline, no thyromegaly or masses palpated, and no cervical lymphadenopathy.  Supple,       

      full range of motion without nuchal rigidity, or vertebral point tenderness.  No            

      Meningismus. Chest/axilla:  Normal chest wall appearance and motion.  Nontender with no     

      deformity.  No lesions are appreciated. Cardiovascular:  Regular rate and rhythm with a     

      normal S1 and S2.  No gallops, murmurs, or rubs.  Normal PMI, no JVD.  No pulse             

      deficits. Respiratory:  Lungs have equal breath sounds bilaterally, clear to                

      auscultation and percussion.  No rales, rhonchi or wheezes noted.  No increased work of     

      breathing, no retractions or nasal flaring. Abdomen/GI:  Soft, non-tender, with normal      

      bowel sounds.  No distension or tympany.  No guarding or rebound.  No evidence of           

      tenderness throughout. Female :  Normal external genitalia. Skin:  Warm, dry with         

      normal turgor.  Normal color with no rashes, no lesions, and no evidence of cellulitis.     

      Neuro:  Awake and alert, GCS 15, oriented to person, place, time, and situation.            

      Cranial nerves II-XII grossly intact.  Motor strength 5/5 in all extremities.  Sensory      

      grossly intact.  Cerebellar exam normal.  Normal gait.                                      

10:30 Back: pain, that is mild, that is moderate, ROM is painful, normal spinal alignment         

      noted, CVA tenderness, is absent, muscle spasm, is appreciated in the left low back,        

      right mid back and right low back.                                                          

10:30 Musculoskeletal/extremity: Extremities: all appear grossly normal, with no appreciated      

      pain with palpation, ROM: intact in all extremities, full active range of motion,           

      Circulation is intact in all extremities. Sensation intact. Compartment Syndrome exam       

      of affected extremity: is normal. Weight bearing: able to fully bear weight, DVT Exam:      

      no swelling, negative Homans' sign noted on exam, no appreciated bluish discoloration,      

      no erythema, no increased warmth, pain, tenderness.                                         

                                                                                                  

Vital Signs:                                                                                      

09:33  / 62; Pulse 73; Resp 16; Temp 97.8; Pulse Ox 98% ; Weight 122.47 kg; Height 5    ap3 

      ft. 2 in. (157.48 cm); Pain 7/10;                                                           

09:33 Body Mass Index 49.38 (122.47 kg, 157.48 cm)                                            ap3 

                                                                                                  

MDM:                                                                                              

09:30 Patient medically screened.                                                             [facility] 

10:34 Differential diagnosis: Fatigue Fracture Obesity ruptured disc, spinal injury, sprain,  lucie 

      vertebral fracture. Data reviewed: vital signs, nurses notes, radiologic studies, CT        

      scan, plain films. Data interpreted: Cardiac monitor: rate is 73 beats/min, rhythm is       

      regular, Pulse oximetry: on room air is 98 %. Test interpretation: by ED physician or       

      midlevel provider: plain radiologic studies. Counseling: I had a detailed discussion        

      with the patient and/or guardian regarding: the historical points, exam findings, and       

      any diagnostic results supporting the discharge/admit diagnosis, lab results, radiology     

      results, the need for outpatient follow up, for definitive care, a family practitioner,     

      a neurologist.                                                                              

                                                                                                  

                                                                                             

10:13 Order name: CT Lumbar Spine Wo Con; Complete Time: 11:                                lucie 

                                                                                             

10:34 Order name: Pelvis XRAY; Complete Time: :                                           lucie 

                                                                                                  

Administered Medications:                                                                         

10:31 Drug: Norco (HYDROcodone-acetaminophen) 10 mg-325 mg 1 tabs Route: PO;                  jd3 

12:06 Follow up: Response: No adverse reaction; RASS: Alert and Calm (0)                      jd3 

10:31 Drug: Valium (diazepam) 5 mg Route: PO;                                                 jd3 

11:30 Follow up: Response: No adverse reaction                                                jd3 

10:31 Drug: Motrin (ibuprofen) 800 mg Route: PO;                                              jd3 

11:30 Follow up: Response: No adverse reaction                                                jd3 

                                                                                                  

                                                                                                  

Disposition Summary:                                                                              

22 11:29                                                                                    

Discharge Ordered                                                                                 

      Location: Home                                                                          lucie 

      Problem: new                                                                            lucie 

      Symptoms: have improved                                                                 lucie 

      Condition: Stable                                                                       lucie 

      Diagnosis                                                                                   

        - Fall on same level, unspecified                                                     lucie 

        - Other injury of muscle, fascia and tendon of lower back                             lucie 

        - Low back pain                                                                       lucie 

        - Sciatica, right side                                                                lucie 

        - Obesity, unspecified                                                                lucie 

        - Other ovarian cysts - 6.8 cm solid/cystic lesion, left                              lucie 

        - Other cholelithiasis without obstruction                                            lucie 

      Followup:                                                                               lucie 

        - With: Private Physician                                                                  

        - When: 5 - 6 days                                                                         

        - Reason: Recheck today's complaints, Continuance of care, Re-evaluation by your           

      physician                                                                                   

      Followup:                                                                               lucie 

        - With:                                                                                    

        - When: 2 - 3 days                                                                         

        - Reason: Recheck today's complaints, Re-evaluation by your physician                      

      Followup:                                                                               lucie 

        - With: Sera Clark MD                                                              

        - When: 2 - 3 days                                                                         

        - Reason: Recheck today's complaints, Re-evaluation by your physician                      

      Discharge Instructions:                                                                     

        - Discharge Summary Sheet                                                             lucie 

        - Acute Back Pain, Adult                                                              lucie 

        - Chronic Back Pain                                                                   lucie 

        - Musculoskeletal Pain                                                                lucie 

        - Obesity, Adult                                                                      lucie 

        - Ovarian Cyst                                                                        lucie 

        - Sciatica                                                                            lucie 

        - Chronic Back Pain, Easy-to-Read                                                     lucie 

        - Obesity, Adult, Easy-to-Read                                                        lucie 

        - Cholelithiasis, Easy-to-Read                                                        lucie 

        - Ovarian Tumors                                                                      lucie 

      Forms:                                                                                      

        - Medication Reconciliation Form                                                      lucie 

        - Thank You Letter                                                                    lucie 

        - Antibiotic Education                                                                lucie 

        - Prescription Opioid Use                                                             lucie 

      Prescriptions:                                                                              

        - Ibuprofen 600 mg Oral Tablet                                                             

            - take 1 tablet by ORAL route every 6 hours As needed take with food; 30 tablet;  lucie 

      Refills: 0, Product Selection Permitted                                                     

        - Medrol (Osvaldo) 4 mg Oral Tablets, Dose Pack                                                

            - take 1 tablet by ORAL route as directed - follow package instructions; 1        lucie 

      packet; Refills: 0, Product Selection Permitted                                             

        - Cyclobenzaprine 5 mg Oral Tablet                                                         

            - take 1 tablet by ORAL route 3 times per day As needed; 15 tablet; Refills: 0,   lucie 

      Product Selection Permitted                                                                 

        - Tylenol-Codeine #3 300 mg-30 mg Oral                                                     

            - take 2 tablet by ORAL route every 6 hours; 20 tablet; Refills: 0, Product       lucie 

      Selection Permitted                                                                         

Signatures:                                                                                       

Dispatcher Luis Alberto Grant MD MD cha Davies, Jonathon RN                    RN   jd3                                                  

Debby Ferimn RN                    RN   ap3                                                  

                                                                                                  

**************************************************************************************************

## 2022-06-29 NOTE — RAD REPORT
EXAM DESCRIPTION:  CT - Spine Lumbar Wo Con - 6/29/2022 10:43 am

 

CLINICAL HISTORY:

Low back pain, trauma

 

COMPARISON:  No comparisons

 

TECHNIQUE:  Axial noncontrast CT imaging of the lumbar spine was performed with coronal and sagittal 
re-formatted images.

 

All CT scans are performed using dose optimization technique as appropriate and may include automated
 exposure control or mA/KV adjustment according to patient size.

 

FINDINGS:  No acute lumbar spine fracture seen. No aggressive marrow pattern or malalignment. Inciden
tally noted 6.8 cm left adnexal cystic and solid lesion. Cholelithiasis noted. Atherosclerosis.

 

Paraspinal tissues are normal in thickness. No paraspinal abscess or hematoma seen.

 

Grade 1 anterolisthesis of L5 on S1. A partially calcified broad-based disc bulge is present at the L
2-3 level. This results in mild to moderate central spinal stenosis. Multilevel degenerative disc dis
ease noted with moderate to severe facet degenerative changes that are most prominent at L4-5 and L5-
S1. There is evidence of bilateral neural foraminal narrowing, notably at L4-5 and L5-S1.

 

Intervertebral disc disease assessment is inherently limited by CT. Within these limitations, no high
-grade canal stenosis suspected.

 

IMPRESSION:  No acute fracture of the lumbar spine. Degenerative disc disease is present as noted abo
ve with varying degrees of neural foraminal narrowing and central spinal stenosis.

 

Partially imaged cystic and solid left adnexal mass that is suspicious for an ovarian neoplasm. Recom
[facility] gynecologic referral.

## 2022-06-29 NOTE — ER
Nurse's Notes                                                                                     

 [facility]                                                                 

Name: Ebony Haro                                                                            

Age: 52 yrs                                                                                       

Sex: Female                                                                                       

: 1970                                                                                   

MRN: [id number]                                                                                   

Arrival Date: 2022                                                                          

Time: 09:22                                                                                       

Account#: [id number]                                                                            

Bed 16                                                                                            

Private MD:                                                                                       

Diagnosis: Fall on same level, unspecified;Other injury of muscle, fascia and tendon of lower     

  back;Low back pain;Sciatica, right side;Obesity, unspecified;Other ovarian cysts-6.8            

  cm solid/cystic lesion, left;Other cholelithiasis without obstruction                           

                                                                                                  

Presentation:                                                                                     

                                                                                             

09:33 Chief complaint: Patient states: she fell in the tub a few days ago, and has been       ap3 

      having right lower back pain since. She reports that the pain has improved, as she          

      states she was unable to walk yesterday due to pain. Coronavirus screen: At this time,      

      the client does not indicate any symptoms associated with coronavirus-19. Ebola Screen:     

      No symptoms or risks identified at this time. Initial Sepsis Screen: Does the patient       

      meet any 2 criteria? No. Patient's initial sepsis screen is negative. Does the patient      

      have a suspected source of infection? No. Patient's initial sepsis screen is negative.      

      Risk Assessment: Do you want to hurt yourself or someone else? Patient reports no           

      desire to harm self or others. Onset of symptoms was 2022.                         

09:33 Method Of Arrival: Ambulatory                                                           ap3 

09:33 Acuity: OLIVER 4                                                                           ap3 

                                                                                                  

Triage Assessment:                                                                                

09:36 General: Appears in no apparent distress. Behavior is calm, cooperative, appropriate    ap3 

      for age. Pain: Complains of pain in right low back Pain currently is 7 out of 10 on a       

      pain scale. at worst was 10 out of 10 on a pain scale. Pain began suddenly, 2-3 days        

      ago. Alleviated by rest, heat application. Neuro: Level of Consciousness is awake,          

      alert, obeys commands, Oriented to person, place, time, situation, Gait is steady,          

      Speech is normal. Cardiovascular: Patient's skin is warm and dry. Respiratory: Airway       

      is patent Respiratory effort is even, unlabored, Respiratory pattern is regular,            

      symmetrical. Musculoskeletal: Range of motion: intact in all extremities.                   

                                                                                                  

OB/GYN:                                                                                           

09:37 LMP 2022                                                                           ap3 

                                                                                                  

Historical:                                                                                       

- Allergies:                                                                                      

09:35 No Known Allergies;                                                                     ap3 

- Home Meds:                                                                                      

09:35 lisinopril 5 mg Oral tab 1 tab once daily [Active];                                     ap3 

- PMHx:                                                                                           

09:35 Hypertension;                                                                           ap3 

                                                                                                  

- Immunization history:: Client reports having NOT received the Covid vaccine.                    

- Social history:: Smoking status: Patient denies any tobacco usage or history of.                

- Family history:: not pertinent.                                                                 

                                                                                                  

                                                                                                  

Screenin:37 Abuse screen: Denies threats or abuse. Nutritional screening: No deficits noted.        ap3 

      Tuberculosis screening: No symptoms or risk factors identified. Fall Risk None              

      identified.                                                                                 

                                                                                                  

Assessment:                                                                                       

10:32 General: Appears in no apparent distress. comfortable, Behavior is calm, cooperative,   jd3 

      appropriate for age. Pain: Complains of pain in low back area Pain radiates to right        

      hip Quality of pain is described as shooting, tender. Neuro: Castillo                       

      Agitation-Sedation Scale (RASS): 0 - Alert and Calm Level of Consciousness is awake,        

      alert, obeys commands, Oriented to person, place, time, situation. Cardiovascular:          

      Capillary refill < 3 seconds Patient's skin is warm and dry. Respiratory: Airway is         

      patent Respiratory effort is even, unlabored, Respiratory pattern is regular,               

      symmetrical. GI: No signs and/or symptoms were reported involving the gastrointestinal      

      system. : No signs and/or symptoms were reported regarding the genitourinary system.      

      EENT: No signs and/or symptoms were reported regarding the EENT system. Derm: No signs      

      and/or symptoms reported regarding the dermatologic system. Musculoskeletal:                

      Circulation, motion, and sensation intact. Range of motion: intact in all extremities.      

12:04 Reassessment: Patient appears in no apparent distress at this time. Patient and/or      jd3 

      family updated on plan of care and expected duration. Pain level reassessed. Patient is     

      alert, oriented x 3, equal unlabored respirations, skin warm/dry/pink. Patient states       

      feeling better.                                                                             

                                                                                                  

Vital Signs:                                                                                      

09:33  / 62; Pulse 73; Resp 16; Temp 97.8; Pulse Ox 98% ; Weight 122.47 kg; Height 5    ap3 

      ft. 2 in. (157.48 cm); Pain 7/10;                                                           

09:33 Body Mass Index 49.38 (122.47 kg, 157.48 cm)                                            ap3 

                                                                                                  

ED Course:                                                                                        

09:22 Patient arrived in ED.                                                                  as  

09:30 Luis Alberto Leyva MD is Attending Physician.                                             lucie 

09:35 Triage completed.                                                                       ap3 

09:36 Arm band placed on right wrist.                                                         ap3 

10:14 Adonis Schmidt RN is Primary Nurse.                                                  jd3 

10:33 Patient has correct armband on for positive identification. Bed in low position. Call   jd3 

      light in reach. Pulse ox on. NIBP on.                                                       

10:45 CT Lumbar Spine Wo Con In Process Unspecified.                                          EDMS

10:57 Pelvis XRAY In Process Unspecified.                                                     EDMS

11:28 Phillip Hussein MD is Referral Physician.                                             lucie 

11:30 Sera Clark MD is Referral Physician.                                            lucie 

12:05 No provider procedures requiring assistance completed. Patient did not have IV access   jd3 

      during this emergency room visit.                                                           

                                                                                                  

Administered Medications:                                                                         

10:31 Drug: Norco (HYDROcodone-acetaminophen) 10 mg-325 mg 1 tabs Route: PO;                  jd3 

12:06 Follow up: Response: No adverse reaction; RASS: Alert and Calm (0)                      jd3 

10:31 Drug: Valium (diazepam) 5 mg Route: PO;                                                 jd3 

11:30 Follow up: Response: No adverse reaction                                                jd3 

10:31 Drug: Motrin (ibuprofen) 800 mg Route: PO;                                              jd3 

11:30 Follow up: Response: No adverse reaction                                                jd3 

                                                                                                  

                                                                                                  

Medication:                                                                                       

10:34 VIS not applicable for this client.                                                     jd3 

                                                                                                  

Outcome:                                                                                          

11:29 Discharge ordered by MD.                                                                lucie 

12:05 Discharged to home ambulatory, with family.                                             jd3 

12:05 Condition: stable                                                                           

12:05 Discharge instructions given to patient, Instructed on discharge instructions, follow       

      up and referral plans. medication usage, Demonstrated understanding of instructions,        

      follow-up care, medications, Prescriptions given X 4.                                       

12:05 Patient left the ED.                                                                    jd3 

                                                                                                  

Signatures:                                                                                       

Dispatcher [employer]                           EDMS                                                 

Luis Alberto Leyva MD MD cha Martinez, Amelia                             as                                                   

Adonis Schmidt, RN                    RN   jd3                                                  

Debby Fermin RN                    RN   ap3                                                  

                                                                                                  

**************************************************************************************************

## 2022-06-29 NOTE — RAD REPORT
EXAM DESCRIPTION:  RAD - Pelvis - 6/29/2022 10:55 am

 

CLINICAL HISTORY:  PAIN

 

COMPARISON:  Spine Lumbar Wo Con dated 6/29/2022

 

FINDINGS/IMPRESSION:  No acute fracture. No malalignment. Degenerative changes are present in the low
er spine. Numerous pelvic phleboliths. Mild right acetabular degenerative changes.